# Patient Record
Sex: FEMALE | Race: WHITE | Employment: UNEMPLOYED | URBAN - METROPOLITAN AREA
[De-identification: names, ages, dates, MRNs, and addresses within clinical notes are randomized per-mention and may not be internally consistent; named-entity substitution may affect disease eponyms.]

---

## 2017-01-16 NOTE — PROGRESS NOTES
Regan James is a 40year old female. HPI:   Patient presents for a medication follow up. Restarted Lexapro in 8/2016. Doing well, less anxious. No side effects. Weight up a bit, but she had lost weight when she was having more anxiety.   Wo

## 2017-04-13 ENCOUNTER — LAB ENCOUNTER (OUTPATIENT)
Dept: LAB | Age: 37
End: 2017-04-13
Attending: PHYSICIAN ASSISTANT
Payer: COMMERCIAL

## 2017-04-13 ENCOUNTER — OFFICE VISIT (OUTPATIENT)
Dept: FAMILY MEDICINE CLINIC | Facility: CLINIC | Age: 37
End: 2017-04-13

## 2017-04-13 VITALS
HEIGHT: 63 IN | BODY MASS INDEX: 21.47 KG/M2 | HEART RATE: 84 BPM | DIASTOLIC BLOOD PRESSURE: 60 MMHG | RESPIRATION RATE: 16 BRPM | TEMPERATURE: 98 F | SYSTOLIC BLOOD PRESSURE: 100 MMHG | WEIGHT: 121.19 LBS

## 2017-04-13 DIAGNOSIS — R53.83 FATIGUE, UNSPECIFIED TYPE: ICD-10-CM

## 2017-04-13 DIAGNOSIS — R10.84 ABDOMINAL PAIN, GENERALIZED: Primary | ICD-10-CM

## 2017-04-13 DIAGNOSIS — A09 INFECTIOUS COLITIS, ENTERITIS, AND GASTROENTERITIS: ICD-10-CM

## 2017-04-13 DIAGNOSIS — R10.84 GENERALIZED ABDOMINAL PAIN: Primary | ICD-10-CM

## 2017-04-13 DIAGNOSIS — R19.7 DIARRHEA OF PRESUMED INFECTIOUS ORIGIN: ICD-10-CM

## 2017-04-13 PROCEDURE — 83690 ASSAY OF LIPASE: CPT | Performed by: PHYSICIAN ASSISTANT

## 2017-04-13 PROCEDURE — 36415 COLL VENOUS BLD VENIPUNCTURE: CPT | Performed by: PHYSICIAN ASSISTANT

## 2017-04-13 PROCEDURE — 85025 COMPLETE CBC W/AUTO DIFF WBC: CPT | Performed by: PHYSICIAN ASSISTANT

## 2017-04-13 PROCEDURE — 80053 COMPREHEN METABOLIC PANEL: CPT | Performed by: PHYSICIAN ASSISTANT

## 2017-04-13 PROCEDURE — 99214 OFFICE O/P EST MOD 30 MIN: CPT | Performed by: PHYSICIAN ASSISTANT

## 2017-04-13 PROCEDURE — 82150 ASSAY OF AMYLASE: CPT | Performed by: PHYSICIAN ASSISTANT

## 2017-04-13 NOTE — PROGRESS NOTES
CC:  Patient presents with:  Diarrhea: Abdominal cramping,diarrhea and flatulence along with fatigue and genralized aches beginning 3 days ago. Less tired today but diarrhea,cramping and gas continues twice a day.       HPI: Christ Espinoza presents with complai mesalamine (CANASA) 1000 MG Rectal Suppos Place 1,000 mg rectally 3 (three) times a week. Pt takes 3 times a week at night. Disp:  Rfl:    Benzoyl Peroxide-Erythromycin 5-3 % Apply Externally Gel Apply to face once daily.   Disp:  Rfl:    acyclovir 400 M abdominal pain  (primary encounter diagnosis)  Diarrhea of presumed infectious origin  Fatigue, unspecified type  I will review labs. I advised clear liquids advancing into a BRAT diet. She can advance her diet from there as tolerated.  She may try loperam

## 2017-04-13 NOTE — PATIENT INSTRUCTIONS
Unknown Causes of Abdominal Pain (Female)    The exact cause of your abdominal (stomach) pain is not clear. This does not mean that this is something to worry about.  Everyone likes to know the exact cause of the problem, but sometimes with abdominal pain · Water is important so you do not get dehydrated. Soup may also be good. Sports drinks may also help, especially if they are not too acidic. Make sure you don't drink sugary drinks as this can make things worse. Take liquids in small amounts.  Do not guzzl © 7626-5517 The [address] [place] [address], [address] [place]. All rights reserved. This information is not intended as a substitute for professional medical care. Always follow your healthcare professional's instructions.         Diet fo © 6459-8003 [address], [address] [place]. All rights reserved. This information is not intended as a substitute for professional medical care. Always follow your healthcare professional's instructions.

## 2017-04-14 DIAGNOSIS — R19.7 DIARRHEA, UNSPECIFIED TYPE: ICD-10-CM

## 2017-04-14 DIAGNOSIS — E87.6 LOW SERUM POTASSIUM: Primary | ICD-10-CM

## 2017-04-27 ENCOUNTER — LAB ENCOUNTER (OUTPATIENT)
Dept: LAB | Age: 37
End: 2017-04-27
Attending: PHYSICIAN ASSISTANT
Payer: COMMERCIAL

## 2017-04-27 ENCOUNTER — CHARTING TRANS (OUTPATIENT)
Dept: OTHER | Age: 37
End: 2017-04-27

## 2017-04-27 DIAGNOSIS — E87.6 LOW SERUM POTASSIUM: ICD-10-CM

## 2017-04-27 DIAGNOSIS — R19.7 DIARRHEA, UNSPECIFIED TYPE: ICD-10-CM

## 2017-04-27 DIAGNOSIS — E83.51 HYPOCALCEMIA: ICD-10-CM

## 2017-04-27 DIAGNOSIS — E83.51 HYPOCALCEMIA: Primary | ICD-10-CM

## 2017-04-27 DIAGNOSIS — E83.51 LOW CALCIUM LEVELS: Primary | ICD-10-CM

## 2017-04-27 PROCEDURE — 80053 COMPREHEN METABOLIC PANEL: CPT | Performed by: PHYSICIAN ASSISTANT

## 2017-04-27 PROCEDURE — 83735 ASSAY OF MAGNESIUM: CPT | Performed by: PHYSICIAN ASSISTANT

## 2017-04-27 PROCEDURE — 82306 VITAMIN D 25 HYDROXY: CPT | Performed by: PHYSICIAN ASSISTANT

## 2017-04-27 PROCEDURE — 36415 COLL VENOUS BLD VENIPUNCTURE: CPT | Performed by: PHYSICIAN ASSISTANT

## 2017-04-28 DIAGNOSIS — E55.9 VITAMIN D DEFICIENCY: Primary | ICD-10-CM

## 2017-05-01 ENCOUNTER — CHARTING TRANS (OUTPATIENT)
Dept: OTHER | Age: 37
End: 2017-05-01

## 2017-05-08 ENCOUNTER — CHARTING TRANS (OUTPATIENT)
Dept: OTHER | Age: 37
End: 2017-05-08

## 2017-05-18 ENCOUNTER — CHARTING TRANS (OUTPATIENT)
Dept: OTHER | Age: 37
End: 2017-05-18

## 2017-05-22 ENCOUNTER — CHARTING TRANS (OUTPATIENT)
Dept: GASTROENTEROLOGY | Age: 37
End: 2017-05-22

## 2017-06-16 ENCOUNTER — CHARTING TRANS (OUTPATIENT)
Dept: OTHER | Age: 37
End: 2017-06-16

## 2017-07-28 ENCOUNTER — APPOINTMENT (OUTPATIENT)
Dept: LAB | Age: 37
End: 2017-07-28
Attending: FAMILY MEDICINE
Payer: COMMERCIAL

## 2017-07-28 DIAGNOSIS — E55.9 VITAMIN D DEFICIENCY: ICD-10-CM

## 2017-07-28 PROCEDURE — 82310 ASSAY OF CALCIUM: CPT | Performed by: PHYSICIAN ASSISTANT

## 2017-07-28 PROCEDURE — 82306 VITAMIN D 25 HYDROXY: CPT | Performed by: PHYSICIAN ASSISTANT

## 2017-07-28 PROCEDURE — 36415 COLL VENOUS BLD VENIPUNCTURE: CPT | Performed by: PHYSICIAN ASSISTANT

## 2017-07-29 LAB
25-HYDROXYVITAMIN D (TOTAL): 34.7 NG/ML (ref 30–100)
CALCIUM BLD-MCNC: 8.5 MG/DL (ref 8.3–10.3)

## 2017-09-06 ENCOUNTER — CHARTING TRANS (OUTPATIENT)
Dept: OTHER | Age: 37
End: 2017-09-06

## 2017-12-22 ENCOUNTER — CHARTING TRANS (OUTPATIENT)
Dept: OTHER | Age: 37
End: 2017-12-22

## 2018-01-05 ENCOUNTER — CHARTING TRANS (OUTPATIENT)
Dept: OTHER | Age: 38
End: 2018-01-05

## 2018-01-31 NOTE — TELEPHONE ENCOUNTER
Fax received from [facility] requesting refill of Escitalopram.    LOV 4/13/17 was acute. The last appt for anxiety was 1/16/17. No future appointments.      Refill request for:      Pending Prescriptions Disp Refills    escitalopram 10 MG Oral Tab 90 tablet

## 2018-02-16 NOTE — PROGRESS NOTES
Yolande Flores is a 45year old female. HPI:   Samy Pierre presents for anxiety. She states her medication is controlling her anxiety well. She takes her Xanax very rarely. No insomnia or panic attacks. She has no other complaints.     Wt Readings heart sounds; excellent peripheral pulses  Abdomen: No distention  Extremities: No pedal edema  Neuro: A&O x 3; normal gait and balance  Skin: No visible rashes  Psych: Normal mood, affect, memory, judgement, and thought process    ASSESSMENT AND PLAN:   A

## 2018-02-20 ENCOUNTER — CHARTING TRANS (OUTPATIENT)
Dept: OTHER | Age: 38
End: 2018-02-20

## 2018-04-12 ENCOUNTER — CHARTING TRANS (OUTPATIENT)
Dept: OTHER | Age: 38
End: 2018-04-12

## 2018-08-02 NOTE — TELEPHONE ENCOUNTER
Fax received from [facility] requesting a refill of Escitalopram.    LOV 2/16/2018 was with Sherice COLE. He no longer works in our office. At that time, pt was advised to follow up in 6 months. Please call pt and assist her in scheduling appt.  Ask her if she

## 2018-08-03 RX ORDER — ESCITALOPRAM OXALATE 10 MG/1
10 TABLET ORAL DAILY
Qty: 90 TABLET | Refills: 1 | Status: SHIPPED | OUTPATIENT
Start: 2018-08-03 | End: 2018-08-21

## 2018-08-03 NOTE — TELEPHONE ENCOUNTER
Pt made appt for medication refill and needs a refill sent to her new local [facility].   Generic Lexapro

## 2018-08-14 ENCOUNTER — CHARTING TRANS (OUTPATIENT)
Dept: OTHER | Age: 38
End: 2018-08-14

## 2018-08-21 NOTE — PROGRESS NOTES
Palmira Oreilly is a 45year old female. HPI:   Patient presents for a medication follow up. Restarted Lexapro in 8/2016. Feeling well on it. No side effects. Using xanax one time per month. Moved to [employer].      Patient Active Problem Lis

## 2018-09-05 ENCOUNTER — CHARTING TRANS (OUTPATIENT)
Dept: OTHER | Age: 38
End: 2018-09-05

## 2018-10-08 ENCOUNTER — CHARTING TRANS (OUTPATIENT)
Dept: OTHER | Age: 38
End: 2018-10-08

## 2018-10-09 ENCOUNTER — LAB SERVICES (OUTPATIENT)
Dept: OTHER | Age: 38
End: 2018-10-09

## 2018-10-09 ENCOUNTER — CHARTING TRANS (OUTPATIENT)
Dept: OTHER | Age: 38
End: 2018-10-09

## 2018-10-09 LAB
ALBUMIN SERPL BCG-MCNC: 4.3 G/DL (ref 3.6–5.1)
ALP SERPL-CCNC: 42 U/L (ref 45–130)
ALT SERPL W/O P-5'-P-CCNC: 12 U/L (ref 7–34)
AST SERPL-CCNC: 18 U/L (ref 9–37)
BILIRUB SERPL-MCNC: 0.5 MG/DL (ref 0–1)
BUN SERPL-MCNC: 16 MG/DL (ref 7–20)
CALCIUM SERPL-MCNC: 8.8 MG/DL (ref 8.6–10.6)
CHLORIDE SERPL-SCNC: 103 MMOL/L (ref 96–107)
CHOLEST SERPL-MCNC: 174 MG/DL (ref 140–200)
CREAT SERPL-MCNC: 1 MG/DL (ref 0.5–1.4)
DIFFERENTIAL TYPE: NORMAL
GFR SERPL CREATININE-BSD FRML MDRD: >60 ML/MIN/{1.73M2}
GFR SERPL CREATININE-BSD FRML MDRD: >60 ML/MIN/{1.73M2}
GLUCOSE SERPL-MCNC: 91 MG/DL (ref 70–200)
HCO3 SERPL-SCNC: 27 MMOL/L (ref 22–32)
HDLC SERPL-MCNC: 72 MG/DL
HEMATOCRIT: 36.7 % (ref 34–45)
HEMOGLOBIN: 12.2 G/DL (ref 11.2–15.7)
LDLC SERPL CALC-MCNC: 86 MG/DL (ref 0–100)
LYMPH PERCENT: 30.5 % (ref 20.5–51.1)
LYMPHOCYTE ABSOLUTE #: 1.5 10*3/UL (ref 1.2–3.4)
MEAN CORPUSCULAR HGB CONCENTRATION: 33.2 % (ref 32–36)
MEAN CORPUSCULAR HGB: 30.6 PG (ref 27–34)
MEAN CORPUSCULAR VOLUME: 92 FL (ref 79–95)
MEAN PLATELET VOLUME: 10 FL (ref 8.6–12.4)
MIXED %: 8 % (ref 4.3–12.9)
MIXED ABSOLUTE #: 0.4 10*3/UL (ref 0.2–0.9)
NEUTROPHIL ABSOLUTE #: 3.1 10*3/UL (ref 1.4–6.5)
NEUTROPHIL PERCENT: 61.5 % (ref 34–73.5)
PLATELET COUNT: 249 10*3/UL (ref 150–400)
POTASSIUM SERPL-SCNC: 4.1 MMOL/L (ref 3.5–5.3)
PROT SERPL-MCNC: 6.7 G/DL (ref 6.2–8.1)
RED BLOOD CELL COUNT: 3.99 10*6/UL (ref 3.7–5.2)
RED CELL DISTRIBUTION WIDTH: 12.7 % (ref 11.3–14.8)
SEDIMENTATION RATE, RBC: 7 MM/H (ref 0–20)
SODIUM SERPL-SCNC: 140 MMOL/L (ref 136–146)
TRIGL SERPL-MCNC: 78 MG/DL (ref 0–200)
WHITE BLOOD CELL COUNT: 5 10*3/UL (ref 4–10)

## 2018-11-02 VITALS
SYSTOLIC BLOOD PRESSURE: 90 MMHG | DIASTOLIC BLOOD PRESSURE: 68 MMHG | TEMPERATURE: 98.6 F | BODY MASS INDEX: 22.55 KG/M2 | HEIGHT: 64 IN | WEIGHT: 132.06 LBS

## 2018-11-28 VITALS — WEIGHT: 123 LBS | HEIGHT: 64 IN | BODY MASS INDEX: 21 KG/M2 | HEART RATE: 64 BPM | RESPIRATION RATE: 14 BRPM

## 2018-12-13 DIAGNOSIS — B00.9 HERPES SIMPLEX: Primary | ICD-10-CM

## 2018-12-14 RX ORDER — ACYCLOVIR 400 MG/1
TABLET ORAL
Qty: 15 TABLET | Refills: 0 | Status: SHIPPED | OUTPATIENT
Start: 2018-12-14 | End: 2019-02-01 | Stop reason: SDUPTHER

## 2019-02-01 DIAGNOSIS — B00.9 HERPES SIMPLEX: ICD-10-CM

## 2019-02-02 RX ORDER — ACYCLOVIR 400 MG/1
TABLET ORAL
Qty: 15 TABLET | Refills: 1 | Status: SHIPPED | OUTPATIENT
Start: 2019-02-02 | End: 2019-12-19 | Stop reason: SDUPTHER

## 2019-03-05 VITALS — BODY MASS INDEX: 20.83 KG/M2 | HEIGHT: 64 IN | WEIGHT: 122 LBS

## 2019-11-09 NOTE — TELEPHONE ENCOUNTER
Patient scheduled her physical on 12/19 with Dr. Peggy Lutz. Will need a temporary medication refill till her appointment.

## 2019-11-11 RX ORDER — ESCITALOPRAM OXALATE 10 MG/1
TABLET ORAL
Qty: 30 TABLET | Refills: 0 | Status: SHIPPED | OUTPATIENT
Start: 2019-11-11 | End: 2019-12-19

## 2019-11-14 ENCOUNTER — TELEPHONE (OUTPATIENT)
Dept: GASTROENTEROLOGY | Age: 39
End: 2019-11-14

## 2019-11-18 RX ORDER — MESALAMINE 1000 MG/1
1000 SUPPOSITORY RECTAL NIGHTLY
COMMUNITY
Start: 2018-10-08 | End: 2019-11-18 | Stop reason: SDUPTHER

## 2019-11-18 RX ORDER — MESALAMINE 1000 MG/1
1000 SUPPOSITORY RECTAL NIGHTLY
Qty: 30 SUPPOSITORY | Refills: 2 | Status: SHIPPED | OUTPATIENT
Start: 2019-11-18 | End: 2019-12-09 | Stop reason: SDUPTHER

## 2019-11-23 RX ORDER — MESALAMINE 1000 MG/1
SUPPOSITORY RECTAL
Qty: 30 SUPPOSITORY | Refills: 6 | Status: SHIPPED | OUTPATIENT
Start: 2019-11-23 | End: 2019-12-09 | Stop reason: SDUPTHER

## 2019-12-09 ENCOUNTER — LAB SERVICES (OUTPATIENT)
Dept: LAB | Age: 39
End: 2019-12-09

## 2019-12-09 ENCOUNTER — OFFICE VISIT (OUTPATIENT)
Dept: GASTROENTEROLOGY | Age: 39
End: 2019-12-09

## 2019-12-09 VITALS
SYSTOLIC BLOOD PRESSURE: 86 MMHG | DIASTOLIC BLOOD PRESSURE: 60 MMHG | HEIGHT: 64 IN | WEIGHT: 121 LBS | BODY MASS INDEX: 20.66 KG/M2

## 2019-12-09 DIAGNOSIS — K51.20 ULCERATIVE PROCTITIS WITHOUT COMPLICATION (CMD): Primary | ICD-10-CM

## 2019-12-09 DIAGNOSIS — K51.20 ULCERATIVE PROCTITIS WITHOUT COMPLICATION (CMD): ICD-10-CM

## 2019-12-09 LAB
ALBUMIN SERPL-MCNC: 4.2 G/DL (ref 3.6–5.1)
ALP SERPL-CCNC: 55 U/L (ref 45–130)
ALT SERPL W/O P-5'-P-CCNC: 11 U/L (ref 4–38)
AST SERPL-CCNC: 22 U/L (ref 14–43)
BASOPHIL %: 0.4 % (ref 0–1.2)
BASOPHIL ABSOLUTE #: 0 10*3/UL (ref 0–0.1)
BILIRUB SERPL-MCNC: 0.2 MG/DL (ref 0–1.3)
BUN SERPL-MCNC: 23 MG/DL (ref 7–20)
CALCIUM SERPL-MCNC: 9.5 MG/DL (ref 8.6–10.6)
CHLORIDE SERPL-SCNC: 103 MMOL/L (ref 96–107)
CO2 SERPL-SCNC: 27 MMOL/L (ref 22–32)
CREAT SERPL-MCNC: 1 MG/DL (ref 0.5–1.4)
DIFFERENTIAL TYPE: ABNORMAL
EOSINOPHIL %: 1 % (ref 0–10)
EOSINOPHIL ABSOLUTE #: 0.1 10*3/UL (ref 0–0.5)
GFR SERPL CREATININE-BSD FRML MDRD: >60 ML/MIN/{1.73M2}
GFR SERPL CREATININE-BSD FRML MDRD: >60 ML/MIN/{1.73M2}
GLUCOSE SERPL-MCNC: 96 MG/DL (ref 70–200)
HEMATOCRIT: 36.9 % (ref 34–45)
HEMOGLOBIN: 11.8 G/DL (ref 11.2–15.7)
LYMPH PERCENT: 16 % (ref 20.5–51.1)
LYMPHOCYTE ABSOLUTE #: 1.3 10*3/UL (ref 1.2–3.4)
MEAN CORPUSCULAR HGB CONCENTRATION: 32 % (ref 32–36)
MEAN CORPUSCULAR HGB: 29 PG (ref 27–34)
MEAN CORPUSCULAR VOLUME: 90.7 FL (ref 79–95)
MEAN PLATELET VOLUME: 11.1 FL (ref 8.6–12.4)
MONOCYTE ABSOLUTE #: 0.7 10*3/UL (ref 0.2–0.9)
MONOCYTE PERCENT: 8.3 % (ref 4.3–12.9)
NEUTROPHIL ABSOLUTE #: 5.9 10*3/UL (ref 1.4–6.5)
NEUTROPHIL PERCENT: 74.3 % (ref 34–73.5)
PLATELET COUNT: 267 10*3/UL (ref 150–400)
POTASSIUM SERPL-SCNC: 4.3 MMOL/L (ref 3.5–5.3)
PROT SERPL-MCNC: 7 G/DL (ref 6.4–8.5)
RED BLOOD CELL COUNT: 4.07 10*6/UL (ref 3.7–5.2)
RED CELL DISTRIBUTION WIDTH: 13 % (ref 11.3–14.8)
SODIUM SERPL-SCNC: 138 MMOL/L (ref 136–146)
WHITE BLOOD CELL COUNT: 8 10*3/UL (ref 4–10)

## 2019-12-09 PROCEDURE — 99214 OFFICE O/P EST MOD 30 MIN: CPT | Performed by: INTERNAL MEDICINE

## 2019-12-09 PROCEDURE — 85025 COMPLETE CBC W/AUTO DIFF WBC: CPT | Performed by: INTERNAL MEDICINE

## 2019-12-09 PROCEDURE — 36415 COLL VENOUS BLD VENIPUNCTURE: CPT | Performed by: INTERNAL MEDICINE

## 2019-12-09 PROCEDURE — 80053 COMPREHEN METABOLIC PANEL: CPT | Performed by: INTERNAL MEDICINE

## 2019-12-09 RX ORDER — POLYETHYLENE GLYCOL 3350 17 G/17G
POWDER, FOR SOLUTION ORAL
Qty: 255 G | Refills: 0 | Status: SHIPPED | OUTPATIENT
Start: 2019-12-09 | End: 2020-01-23

## 2019-12-09 RX ORDER — BISACODYL 5 MG/1
TABLET, DELAYED RELEASE ORAL
Qty: 4 TABLET | Refills: 0 | Status: SHIPPED | OUTPATIENT
Start: 2019-12-09 | End: 2020-01-23

## 2019-12-09 RX ORDER — VITAMIN B COMPLEX
2 TABLET ORAL DAILY
COMMUNITY

## 2019-12-09 RX ORDER — MULTIVIT WITH CALCIUM,IRON,MIN
1 TABLET ORAL DAILY
COMMUNITY

## 2019-12-09 RX ORDER — MESALAMINE 1000 MG/1
1000 SUPPOSITORY RECTAL NIGHTLY
Qty: 30 SUPPOSITORY | Refills: 11 | Status: SHIPPED | OUTPATIENT
Start: 2019-12-09 | End: 2021-05-17

## 2019-12-09 RX ORDER — ESCITALOPRAM OXALATE 10 MG/1
TABLET ORAL DAILY
COMMUNITY
Start: 2019-11-11 | End: 2023-03-14 | Stop reason: SDUPTHER

## 2019-12-09 RX ORDER — SIMETHICONE 125 MG
TABLET,CHEWABLE ORAL
Qty: 1 TABLET | Refills: 0 | Status: SHIPPED | OUTPATIENT
Start: 2019-12-09 | End: 2020-01-23

## 2019-12-09 RX ORDER — ALPRAZOLAM 0.25 MG/1
0.25 TABLET ORAL PRN
COMMUNITY
Start: 2018-02-16

## 2019-12-13 ENCOUNTER — APPOINTMENT (OUTPATIENT)
Dept: GASTROENTEROLOGY | Age: 39
End: 2019-12-13
Attending: INTERNAL MEDICINE

## 2019-12-13 DIAGNOSIS — K52.9 NONINFECTIVE GASTROENTERITIS AND COLITIS, UNSPECIFIED: ICD-10-CM

## 2019-12-13 LAB — PREGNANCY TEST, URINE: NEGATIVE

## 2019-12-13 PROCEDURE — 45380 COLONOSCOPY AND BIOPSY: CPT | Performed by: INTERNAL MEDICINE

## 2019-12-13 PROCEDURE — 88305 TISSUE EXAM BY PATHOLOGIST: CPT | Performed by: PATHOLOGY

## 2019-12-18 LAB — AP REPORT: NORMAL

## 2019-12-19 ENCOUNTER — OFFICE VISIT (OUTPATIENT)
Dept: FAMILY MEDICINE CLINIC | Facility: CLINIC | Age: 39
End: 2019-12-19
Payer: COMMERCIAL

## 2019-12-19 VITALS
RESPIRATION RATE: 16 BRPM | DIASTOLIC BLOOD PRESSURE: 60 MMHG | BODY MASS INDEX: 21.89 KG/M2 | TEMPERATURE: 98 F | HEIGHT: 62.5 IN | SYSTOLIC BLOOD PRESSURE: 100 MMHG | HEART RATE: 60 BPM | WEIGHT: 122 LBS

## 2019-12-19 DIAGNOSIS — Z00.00 ROUTINE GENERAL MEDICAL EXAMINATION AT A HEALTH CARE FACILITY: Primary | ICD-10-CM

## 2019-12-19 DIAGNOSIS — B00.9 HERPES SIMPLEX: ICD-10-CM

## 2019-12-19 DIAGNOSIS — F41.9 ANXIETY: ICD-10-CM

## 2019-12-19 PROCEDURE — 99395 PREV VISIT EST AGE 18-39: CPT | Performed by: FAMILY MEDICINE

## 2019-12-19 RX ORDER — ESCITALOPRAM OXALATE 10 MG/1
10 TABLET ORAL
Qty: 90 TABLET | Refills: 3 | Status: SHIPPED | OUTPATIENT
Start: 2019-12-19 | End: 2021-01-05

## 2019-12-19 RX ORDER — ACYCLOVIR 400 MG/1
TABLET ORAL
Qty: 15 TABLET | Refills: 0 | Status: SHIPPED | OUTPATIENT
Start: 2019-12-19 | End: 2020-01-23 | Stop reason: SDUPTHER

## 2019-12-19 RX ORDER — AZITHROMYCIN 250 MG/1
TABLET, FILM COATED ORAL
Qty: 6 TABLET | Refills: 0 | Status: SHIPPED | OUTPATIENT
Start: 2019-12-19 | End: 2021-01-05

## 2019-12-19 NOTE — PROGRESS NOTES
HPI:   Astrid Sands is a 44year old female who presents for a complete physical exam.  Last pap:   With GYN  Last mammogram:  /   Menses:  regular  Contraception:  /  Previous colonoscopy:  Regular with GI  Previous DEXA:  /.  Current or previous t History:   Diagnosis Date   • Anxiety state, unspecified    • Herpes simplex without mention of complication 27/48/38   • Mental disorder    • Proctitis       Past Surgical History:   Procedure Laterality Date   • COLONOSCOPY WITH BIOPSY  9/30/13      Morgan Reviewed from GI  · Colonoscopy:  UTD  · Vaccines:  /  · Recommended low fat diet and regular exercise. · The patient is asked to return for CPX in 1 year. · meds refilled  · zpak as directed.   No orders of the defined types were placed in this encoun

## 2020-01-23 DIAGNOSIS — B00.9 HERPES SIMPLEX: ICD-10-CM

## 2020-01-23 RX ORDER — ACYCLOVIR 400 MG/1
TABLET ORAL
Qty: 15 TABLET | Refills: 0 | Status: SHIPPED | OUTPATIENT
Start: 2020-01-23 | End: 2020-10-05

## 2020-05-21 ENCOUNTER — TELEPHONE (OUTPATIENT)
Dept: DERMATOLOGY | Age: 40
End: 2020-05-21

## 2020-06-24 ENCOUNTER — DOCUMENTATION (OUTPATIENT)
Dept: DERMATOLOGY | Age: 40
End: 2020-06-24

## 2020-06-25 ENCOUNTER — OFFICE VISIT (OUTPATIENT)
Dept: DERMATOLOGY | Age: 40
End: 2020-06-25

## 2020-06-25 ENCOUNTER — TELEPHONE (OUTPATIENT)
Dept: DERMATOLOGY | Age: 40
End: 2020-06-25

## 2020-06-25 DIAGNOSIS — L81.2 FRECKLES: Primary | ICD-10-CM

## 2020-06-25 PROCEDURE — 99213 OFFICE O/P EST LOW 20 MIN: CPT | Performed by: DERMATOLOGY

## 2020-06-30 ENCOUNTER — OFFICE VISIT (OUTPATIENT)
Dept: DERMATOLOGY | Age: 40
End: 2020-06-30

## 2020-06-30 DIAGNOSIS — Z41.9 ELECTIVE SURGERY FOR PURPOSES OTHER THAN REMEDYING HEALTH STATES: Primary | ICD-10-CM

## 2020-06-30 PROCEDURE — X0703 COSMETIC BOTOX PER UNIT: HCPCS | Performed by: NURSE PRACTITIONER

## 2020-08-21 ENCOUNTER — HOSPITAL ENCOUNTER (OUTPATIENT)
Dept: MAMMOGRAPHY | Age: 40
Discharge: HOME | End: 2020-08-21
Attending: OBSTETRICS & GYNECOLOGY
Payer: COMMERCIAL

## 2020-08-21 DIAGNOSIS — Z12.31 VISIT FOR SCREENING MAMMOGRAM: ICD-10-CM

## 2020-08-21 PROCEDURE — 77067 SCR MAMMO BI INCL CAD: CPT | Performed by: OBSTETRICS & GYNECOLOGY

## 2020-08-21 PROCEDURE — 77063 BREAST TOMOSYNTHESIS BI: CPT | Performed by: OBSTETRICS & GYNECOLOGY

## 2020-08-24 ENCOUNTER — HOSPITAL ENCOUNTER (OUTPATIENT)
Dept: MAMMOGRAPHY | Facility: HOSPITAL | Age: 40
Discharge: HOME | End: 2020-08-24
Attending: OBSTETRICS & GYNECOLOGY
Payer: COMMERCIAL

## 2020-08-24 DIAGNOSIS — Z12.31 OTHER SCREENING MAMMOGRAM: ICD-10-CM

## 2020-08-24 PROCEDURE — 76642 ULTRASOUND BREAST LIMITED: CPT | Performed by: OBSTETRICS & GYNECOLOGY

## 2020-10-05 DIAGNOSIS — B00.9 HERPES SIMPLEX: ICD-10-CM

## 2020-10-05 RX ORDER — ACYCLOVIR 400 MG/1
TABLET ORAL
Qty: 15 TABLET | Refills: 0 | Status: SHIPPED | OUTPATIENT
Start: 2020-10-05 | End: 2021-02-17

## 2020-12-31 RX ORDER — ESCITALOPRAM OXALATE 10 MG/1
TABLET ORAL
Qty: 90 TABLET | Refills: 0 | OUTPATIENT
Start: 2020-12-31

## 2020-12-31 NOTE — TELEPHONE ENCOUNTER
Refill request for:    Requested Prescriptions     Pending Prescriptions Disp Refills   • ESCITALOPRAM 10 MG Oral Tab [Pharmacy Med Name: Escitalopram Oxalate 10 Mg Tab Solc] 90 tablet 0     Sig: TAKE ONE TABLET BY MOUTH ONE TIME DAILY        Last Prescrib

## 2021-01-05 NOTE — PROGRESS NOTES
Virtual Telephone Check-In    Bryce Sevilla verbally consents to a Virtual/Telephone Check-In visit on 01/05/21. Patient has been referred to the [facility] website at [email or web address] to review the yearly Consent to Treat document.     Patient un

## 2021-01-12 ENCOUNTER — OFFICE VISIT (OUTPATIENT)
Dept: DERMATOLOGY | Age: 41
End: 2021-01-12

## 2021-01-12 DIAGNOSIS — Z41.9 ELECTIVE SURGERY FOR PURPOSES OTHER THAN REMEDYING HEALTH STATES: Primary | ICD-10-CM

## 2021-01-12 PROCEDURE — X0703 COSMETIC BOTOX PER UNIT: HCPCS | Performed by: NURSE PRACTITIONER

## 2021-02-16 DIAGNOSIS — B00.9 HERPES SIMPLEX: ICD-10-CM

## 2021-02-17 RX ORDER — ACYCLOVIR 400 MG/1
TABLET ORAL
Qty: 15 TABLET | Refills: 0 | Status: SHIPPED | OUTPATIENT
Start: 2021-02-17 | End: 2021-04-05 | Stop reason: SDUPTHER

## 2021-03-04 ENCOUNTER — HOSPITAL ENCOUNTER (OUTPATIENT)
Dept: ULTRASOUND IMAGING | Age: 41
Discharge: HOME | End: 2021-03-04
Attending: OBSTETRICS & GYNECOLOGY
Payer: COMMERCIAL

## 2021-03-04 DIAGNOSIS — N63.0 LUMP OR MASS IN BREAST: ICD-10-CM

## 2021-03-04 PROCEDURE — 76642 ULTRASOUND BREAST LIMITED: CPT | Performed by: OBSTETRICS & GYNECOLOGY

## 2021-04-05 DIAGNOSIS — B00.9 HERPES SIMPLEX: ICD-10-CM

## 2021-04-05 RX ORDER — ACYCLOVIR 400 MG/1
400 TABLET ORAL 3 TIMES DAILY
Qty: 15 TABLET | Refills: 0 | Status: SHIPPED | OUTPATIENT
Start: 2021-04-05 | End: 2021-04-06 | Stop reason: SDUPTHER

## 2021-04-06 DIAGNOSIS — B00.9 HERPES SIMPLEX: ICD-10-CM

## 2021-04-06 RX ORDER — ACYCLOVIR 400 MG/1
400 TABLET ORAL 3 TIMES DAILY
Qty: 15 TABLET | Refills: 0 | Status: SHIPPED | OUTPATIENT
Start: 2021-04-06 | End: 2021-08-17

## 2021-04-06 RX ORDER — ACYCLOVIR 400 MG/1
TABLET ORAL
Qty: 15 TABLET | Refills: 0 | OUTPATIENT
Start: 2021-04-06

## 2021-05-13 ENCOUNTER — APPOINTMENT (OUTPATIENT)
Dept: DERMATOLOGY | Age: 41
End: 2021-05-13

## 2021-05-17 RX ORDER — MESALAMINE 1000 MG/1
SUPPOSITORY RECTAL
Qty: 30 SUPPOSITORY | Refills: 5 | Status: SHIPPED | OUTPATIENT
Start: 2021-05-17 | End: 2022-02-16 | Stop reason: SDUPTHER

## 2021-05-27 ENCOUNTER — OFFICE VISIT (OUTPATIENT)
Dept: DERMATOLOGY | Age: 41
End: 2021-05-27

## 2021-05-27 DIAGNOSIS — Z41.9 ELECTIVE SURGERY FOR PURPOSES OTHER THAN REMEDYING HEALTH STATES: Primary | ICD-10-CM

## 2021-05-27 PROCEDURE — X0703 COSMETIC BOTOX PER UNIT: HCPCS | Performed by: NURSE PRACTITIONER

## 2021-08-17 DIAGNOSIS — B00.9 HERPES SIMPLEX: ICD-10-CM

## 2021-08-17 RX ORDER — ACYCLOVIR 400 MG/1
TABLET ORAL
Qty: 15 TABLET | Refills: 0 | Status: SHIPPED | OUTPATIENT
Start: 2021-08-17 | End: 2022-01-10

## 2021-09-03 PROCEDURE — 88175 CYTOPATH C/V AUTO FLUID REDO: CPT | Performed by: OBSTETRICS & GYNECOLOGY

## 2021-09-03 PROCEDURE — 87624 HPV HI-RISK TYP POOLED RSLT: CPT | Performed by: OBSTETRICS & GYNECOLOGY

## 2021-09-24 ENCOUNTER — OFFICE VISIT (OUTPATIENT)
Dept: FAMILY MEDICINE CLINIC | Facility: CLINIC | Age: 41
End: 2021-09-24
Payer: COMMERCIAL

## 2021-09-24 VITALS
SYSTOLIC BLOOD PRESSURE: 90 MMHG | RESPIRATION RATE: 16 BRPM | HEART RATE: 70 BPM | TEMPERATURE: 98 F | WEIGHT: 122.38 LBS | HEIGHT: 63.39 IN | BODY MASS INDEX: 21.42 KG/M2 | DIASTOLIC BLOOD PRESSURE: 60 MMHG

## 2021-09-24 DIAGNOSIS — F41.9 ANXIETY: ICD-10-CM

## 2021-09-24 DIAGNOSIS — Z00.00 WELL WOMAN EXAM (NO GYNECOLOGICAL EXAM): Primary | ICD-10-CM

## 2021-09-24 DIAGNOSIS — Z11.1 SCREENING-PULMONARY TB: ICD-10-CM

## 2021-09-24 PROCEDURE — 99396 PREV VISIT EST AGE 40-64: CPT | Performed by: NURSE PRACTITIONER

## 2021-09-24 PROCEDURE — 3074F SYST BP LT 130 MM HG: CPT | Performed by: NURSE PRACTITIONER

## 2021-09-24 PROCEDURE — 3078F DIAST BP <80 MM HG: CPT | Performed by: NURSE PRACTITIONER

## 2021-09-24 PROCEDURE — 3008F BODY MASS INDEX DOCD: CPT | Performed by: NURSE PRACTITIONER

## 2021-09-24 PROCEDURE — 86580 TB INTRADERMAL TEST: CPT | Performed by: NURSE PRACTITIONER

## 2021-09-24 NOTE — PROGRESS NOTES
Karena Hurd is a 39year old female who presents for a complete physical exam:       Patient has no acute complaints. Has prior history of anxiety, manages with escitalopram. Feels this is working well, mood is stable.  Denies racing thoughts, Diagnosis Date   • Anxiety state, unspecified    • Herpes simplex without mention of complication 83/85/89   • Mental disorder    • Proctitis       Past Surgical History:   Procedure Laterality Date   • COLONOSCOPY WITH BIOPSY  9/30/13      Family Histor or itching.   MUSCULOSKELETAL: denies back pain  NEURO: denies headaches, dizziness, syncope  PSYCH: denies difficulty concentrating, mood changes or suicidal/homicidal ideations      EXAM:   BP 90/60   Pulse 70   Temp 98.3 °F (36.8 °C) (Temporal)   Resp 16 PPD/Tubersol/Aplisol (36384) (DX V74.1/Z11. 1)      Meds & Refills for this Visit:  Requested Prescriptions      No prescriptions requested or ordered in this encounter       Imaging & Consults:  None    No follow-ups on file.   There are no Patient Instruct

## 2021-09-27 ENCOUNTER — NURSE ONLY (OUTPATIENT)
Dept: FAMILY MEDICINE CLINIC | Facility: CLINIC | Age: 41
End: 2021-09-27
Payer: COMMERCIAL

## 2021-09-27 VITALS — TEMPERATURE: 98 F

## 2021-09-27 DIAGNOSIS — Z23 NEEDS FLU SHOT: Primary | ICD-10-CM

## 2021-09-27 DIAGNOSIS — Z11.1 SCREENING FOR TUBERCULOSIS: ICD-10-CM

## 2021-09-27 PROCEDURE — 90686 IIV4 VACC NO PRSV 0.5 ML IM: CPT | Performed by: FAMILY MEDICINE

## 2021-09-27 PROCEDURE — 90471 IMMUNIZATION ADMIN: CPT | Performed by: FAMILY MEDICINE

## 2021-09-27 NOTE — PROGRESS NOTES
Patient is here for TB read and flu shot. She has no complaints. The TB is recorded as 0 mm negative. The flu shot is given in the left deltoid.  Patient has signed consent and given VIS

## 2021-10-13 ENCOUNTER — HOSPITAL ENCOUNTER (OUTPATIENT)
Dept: MAMMOGRAPHY | Age: 41
Discharge: HOME | End: 2021-10-13
Attending: OBSTETRICS & GYNECOLOGY
Payer: COMMERCIAL

## 2021-10-13 DIAGNOSIS — D24.1 FIBROADENOMA OF BREAST, RIGHT: ICD-10-CM

## 2021-10-13 PROCEDURE — 77067 SCR MAMMO BI INCL CAD: CPT | Performed by: OBSTETRICS & GYNECOLOGY

## 2021-10-13 PROCEDURE — 77063 BREAST TOMOSYNTHESIS BI: CPT | Performed by: OBSTETRICS & GYNECOLOGY

## 2021-11-03 ENCOUNTER — HOSPITAL ENCOUNTER (OUTPATIENT)
Dept: ULTRASOUND IMAGING | Age: 41
Discharge: HOME | End: 2021-11-03
Attending: OBSTETRICS & GYNECOLOGY
Payer: COMMERCIAL

## 2021-11-03 DIAGNOSIS — R92.2 INCONCLUSIVE MAMMOGRAM: ICD-10-CM

## 2021-11-03 PROCEDURE — 76642 ULTRASOUND BREAST LIMITED: CPT | Performed by: OBSTETRICS & GYNECOLOGY

## 2021-11-03 NOTE — IMAGING NOTE
Assisted Dr Watson Buenrostro with right breast biopsy x 2 sites recommendation, BIRADS 4a. Explained procedure and written instructions given. Pt screened and denies blood thinners, bleeding issues, chemo or liver disease.  Reviewed to eat, take 1000 mg of acetaminop

## 2021-11-11 ENCOUNTER — APPOINTMENT (OUTPATIENT)
Dept: GASTROENTEROLOGY | Age: 41
End: 2021-11-11

## 2021-11-12 ENCOUNTER — HOSPITAL ENCOUNTER (OUTPATIENT)
Dept: ULTRASOUND IMAGING | Age: 41
Discharge: HOME | End: 2021-11-12
Attending: NURSE PRACTITIONER
Payer: COMMERCIAL

## 2021-11-12 ENCOUNTER — HOSPITAL ENCOUNTER (OUTPATIENT)
Dept: MAMMOGRAPHY | Age: 41
Discharge: HOME | End: 2021-11-12
Attending: NURSE PRACTITIONER
Payer: COMMERCIAL

## 2021-11-12 DIAGNOSIS — N63.0 LUMP OR MASS IN BREAST: ICD-10-CM

## 2021-11-12 PROCEDURE — 19083 BX BREAST 1ST LESION US IMAG: CPT | Performed by: NURSE PRACTITIONER

## 2021-11-12 PROCEDURE — 77065 DX MAMMO INCL CAD UNI: CPT | Performed by: NURSE PRACTITIONER

## 2021-11-12 PROCEDURE — 88342 IMHCHEM/IMCYTCHM 1ST ANTB: CPT | Performed by: NURSE PRACTITIONER

## 2021-11-12 PROCEDURE — 19084 BX BREAST ADD LESION US IMAG: CPT | Performed by: NURSE PRACTITIONER

## 2021-11-12 PROCEDURE — 88305 TISSUE EXAM BY PATHOLOGIST: CPT | Performed by: NURSE PRACTITIONER

## 2021-11-15 ENCOUNTER — TELEPHONE (OUTPATIENT)
Dept: MAMMOGRAPHY | Facility: HOSPITAL | Age: 41
End: 2021-11-15

## 2021-11-15 NOTE — TELEPHONE ENCOUNTER
Telephoned Cheryl Zengortino and name,  verified with patient. Notified Lucita Eller of right breast 2 site positive for [facility] biopsy result. [facility] pending. Lucita Eller reports biopsy site is healing well.  Radiologist recommends

## 2021-12-08 NOTE — H&P (VIEW-ONLY)
12/8/2021    Patient presents with:  Consult: Ref: Dr. Varun Condon right breast ALH and fibroadenoma      HPI:    Alber Sims is a 39year old female who presents for a breast evaluation   Recent ultrasound reveals 2 solid masses right breast, one la pertinent positives and negatives per HPI    EXAM:    GENERAL: well developed, well nourished female, in no apparent distress  SKIN: anicteric  HEENT: normocephalic; sclera anicteric  NECK: supple, no JVD  RESPIRATORY: clear to auscultation  CARDIOVASCULAR device. A clip was then deployed, and post procedure mammogram demonstrated the clip to be in the appropriate position. Post procedural mammogram performed on separate digital equipment show the clip in appropriate position.   The procedure was tolerated 10:01 AM.  INDICATIONS:  N63.0 Lump or mass in breast  DESCRIPTION:  The patient's previous mammogram/ultrasound were reviewed.   The procedure, its alternatives, and potential risks and complications were discussed with the patient prior to the biopsy proc PM               IMPRESSION:    Two solid nodules right breast  Both fibroadenoma with ALH          PLAN:    Wire localization and excision  Details of surgery reviewed  Post op scaring discussed

## 2021-12-09 ENCOUNTER — TELEPHONE (OUTPATIENT)
Dept: GENERAL RADIOLOGY | Facility: HOSPITAL | Age: 41
End: 2021-12-09

## 2021-12-24 ENCOUNTER — LAB ENCOUNTER (OUTPATIENT)
Dept: LAB | Age: 41
End: 2021-12-24
Attending: SURGERY
Payer: COMMERCIAL

## 2021-12-24 DIAGNOSIS — N63.10 BREAST MASS, RIGHT: ICD-10-CM

## 2021-12-27 ENCOUNTER — ANESTHESIA (OUTPATIENT)
Dept: SURGERY | Facility: HOSPITAL | Age: 41
End: 2021-12-27
Payer: COMMERCIAL

## 2021-12-27 ENCOUNTER — HOSPITAL ENCOUNTER (OUTPATIENT)
Dept: MAMMOGRAPHY | Facility: HOSPITAL | Age: 41
Discharge: HOME | End: 2021-12-27
Attending: SURGERY
Payer: COMMERCIAL

## 2021-12-27 ENCOUNTER — ANESTHESIA EVENT (OUTPATIENT)
Dept: SURGERY | Facility: HOSPITAL | Age: 41
End: 2021-12-27
Payer: COMMERCIAL

## 2021-12-27 ENCOUNTER — HOSPITAL ENCOUNTER (OUTPATIENT)
Facility: HOSPITAL | Age: 41
Setting detail: HOSPITAL OUTPATIENT SURGERY
Discharge: HOME | End: 2021-12-27
Attending: SURGERY | Admitting: SURGERY
Payer: COMMERCIAL

## 2021-12-27 VITALS
WEIGHT: 122 LBS | RESPIRATION RATE: 16 BRPM | HEIGHT: 63 IN | BODY MASS INDEX: 21.62 KG/M2 | DIASTOLIC BLOOD PRESSURE: 72 MMHG | HEART RATE: 81 BPM | SYSTOLIC BLOOD PRESSURE: 103 MMHG | TEMPERATURE: 98 F | OXYGEN SATURATION: 100 %

## 2021-12-27 DIAGNOSIS — Z20.822 ENCOUNTER FOR PREOPERATIVE SCREENING LABORATORY TESTING FOR COVID-19 VIRUS: Primary | ICD-10-CM

## 2021-12-27 DIAGNOSIS — N63.10 BREAST MASS, RIGHT: ICD-10-CM

## 2021-12-27 DIAGNOSIS — Z01.812 ENCOUNTER FOR PREOPERATIVE SCREENING LABORATORY TESTING FOR COVID-19 VIRUS: Primary | ICD-10-CM

## 2021-12-27 LAB — B-HCG UR QL: NEGATIVE

## 2021-12-27 PROCEDURE — 19281 PERQ DEVICE BREAST 1ST IMAG: CPT | Performed by: SURGERY

## 2021-12-27 PROCEDURE — 88305 TISSUE EXAM BY PATHOLOGIST: CPT | Performed by: SURGERY

## 2021-12-27 PROCEDURE — BH00ZZZ PLAIN RADIOGRAPHY OF RIGHT BREAST: ICD-10-PCS | Performed by: RADIOLOGY

## 2021-12-27 PROCEDURE — 76098 X-RAY EXAM SURGICAL SPECIMEN: CPT | Performed by: SURGERY

## 2021-12-27 PROCEDURE — 19282 PERQ DEVICE BREAST EA IMAG: CPT | Performed by: SURGERY

## 2021-12-27 PROCEDURE — 81025 URINE PREGNANCY TEST: CPT

## 2021-12-27 PROCEDURE — 0HBT0ZZ EXCISION OF RIGHT BREAST, OPEN APPROACH: ICD-10-PCS | Performed by: SURGERY

## 2021-12-27 RX ORDER — HYDROCODONE BITARTRATE AND ACETAMINOPHEN 5; 325 MG/1; MG/1
1 TABLET ORAL EVERY 4 HOURS PRN
Qty: 20 TABLET | Refills: 0 | Status: SHIPPED | OUTPATIENT
Start: 2021-12-27 | End: 2022-01-11

## 2021-12-27 RX ORDER — HYDROCODONE BITARTRATE AND ACETAMINOPHEN 5; 325 MG/1; MG/1
1 TABLET ORAL AS NEEDED
Status: DISCONTINUED | OUTPATIENT
Start: 2021-12-27 | End: 2021-12-27

## 2021-12-27 RX ORDER — SODIUM CHLORIDE, SODIUM LACTATE, POTASSIUM CHLORIDE, CALCIUM CHLORIDE 600; 310; 30; 20 MG/100ML; MG/100ML; MG/100ML; MG/100ML
INJECTION, SOLUTION INTRAVENOUS CONTINUOUS
Status: DISCONTINUED | OUTPATIENT
Start: 2021-12-27 | End: 2021-12-27

## 2021-12-27 RX ORDER — LIDOCAINE HYDROCHLORIDE AND EPINEPHRINE 10; 10 MG/ML; UG/ML
INJECTION, SOLUTION INFILTRATION; PERINEURAL AS NEEDED
Status: DISCONTINUED | OUTPATIENT
Start: 2021-12-27 | End: 2021-12-27 | Stop reason: HOSPADM

## 2021-12-27 RX ORDER — HYDROCODONE BITARTRATE AND ACETAMINOPHEN 5; 325 MG/1; MG/1
2 TABLET ORAL AS NEEDED
Status: DISCONTINUED | OUTPATIENT
Start: 2021-12-27 | End: 2021-12-27

## 2021-12-27 RX ORDER — ACETAMINOPHEN 500 MG
1000 TABLET ORAL ONCE
Status: DISCONTINUED | OUTPATIENT
Start: 2021-12-27 | End: 2021-12-27 | Stop reason: HOSPADM

## 2021-12-27 RX ORDER — LABETALOL HYDROCHLORIDE 5 MG/ML
5 INJECTION, SOLUTION INTRAVENOUS EVERY 5 MIN PRN
Status: DISCONTINUED | OUTPATIENT
Start: 2021-12-27 | End: 2021-12-27

## 2021-12-27 RX ORDER — CLINDAMYCIN PHOSPHATE 900 MG/50ML
INJECTION INTRAVENOUS
Status: DISCONTINUED
Start: 2021-12-27 | End: 2021-12-27

## 2021-12-27 RX ORDER — ONDANSETRON 2 MG/ML
4 INJECTION INTRAMUSCULAR; INTRAVENOUS AS NEEDED
Status: DISCONTINUED | OUTPATIENT
Start: 2021-12-27 | End: 2021-12-27

## 2021-12-27 RX ORDER — HYDROMORPHONE HYDROCHLORIDE 1 MG/ML
0.4 INJECTION, SOLUTION INTRAMUSCULAR; INTRAVENOUS; SUBCUTANEOUS EVERY 5 MIN PRN
Status: DISCONTINUED | OUTPATIENT
Start: 2021-12-27 | End: 2021-12-27

## 2021-12-27 RX ORDER — LIDOCAINE HYDROCHLORIDE 10 MG/ML
INJECTION, SOLUTION EPIDURAL; INFILTRATION; INTRACAUDAL; PERINEURAL AS NEEDED
Status: DISCONTINUED | OUTPATIENT
Start: 2021-12-27 | End: 2021-12-27 | Stop reason: SURG

## 2021-12-27 RX ORDER — DIAZEPAM 5 MG/1
5 TABLET ORAL AS NEEDED
Status: DISCONTINUED | OUTPATIENT
Start: 2021-12-27 | End: 2021-12-27 | Stop reason: HOSPADM

## 2021-12-27 RX ORDER — METOCLOPRAMIDE HYDROCHLORIDE 5 MG/ML
10 INJECTION INTRAMUSCULAR; INTRAVENOUS AS NEEDED
Status: DISCONTINUED | OUTPATIENT
Start: 2021-12-27 | End: 2021-12-27

## 2021-12-27 RX ORDER — CLINDAMYCIN PHOSPHATE 900 MG/50ML
900 INJECTION INTRAVENOUS ONCE
Status: COMPLETED | OUTPATIENT
Start: 2021-12-27 | End: 2021-12-27

## 2021-12-27 RX ORDER — DIPHENHYDRAMINE HYDROCHLORIDE 50 MG/ML
12.5 INJECTION INTRAMUSCULAR; INTRAVENOUS AS NEEDED
Status: DISCONTINUED | OUTPATIENT
Start: 2021-12-27 | End: 2021-12-27

## 2021-12-27 RX ORDER — MIDAZOLAM HYDROCHLORIDE 1 MG/ML
INJECTION INTRAMUSCULAR; INTRAVENOUS AS NEEDED
Status: DISCONTINUED | OUTPATIENT
Start: 2021-12-27 | End: 2021-12-27 | Stop reason: SURG

## 2021-12-27 RX ORDER — BUPIVACAINE HYDROCHLORIDE 5 MG/ML
INJECTION, SOLUTION EPIDURAL; INTRACAUDAL AS NEEDED
Status: DISCONTINUED | OUTPATIENT
Start: 2021-12-27 | End: 2021-12-27 | Stop reason: HOSPADM

## 2021-12-27 RX ORDER — MEPERIDINE HYDROCHLORIDE 25 MG/ML
INJECTION INTRAMUSCULAR; INTRAVENOUS; SUBCUTANEOUS
Status: COMPLETED
Start: 2021-12-27 | End: 2021-12-27

## 2021-12-27 RX ORDER — MEPERIDINE HYDROCHLORIDE 25 MG/ML
12.5 INJECTION INTRAMUSCULAR; INTRAVENOUS; SUBCUTANEOUS AS NEEDED
Status: DISCONTINUED | OUTPATIENT
Start: 2021-12-27 | End: 2021-12-27

## 2021-12-27 RX ORDER — ACETAMINOPHEN 500 MG
1000 TABLET ORAL ONCE AS NEEDED
Status: DISCONTINUED | OUTPATIENT
Start: 2021-12-27 | End: 2021-12-27

## 2021-12-27 RX ORDER — NALOXONE HYDROCHLORIDE 0.4 MG/ML
80 INJECTION, SOLUTION INTRAMUSCULAR; INTRAVENOUS; SUBCUTANEOUS AS NEEDED
Status: DISCONTINUED | OUTPATIENT
Start: 2021-12-27 | End: 2021-12-27

## 2021-12-27 RX ADMIN — SODIUM CHLORIDE, SODIUM LACTATE, POTASSIUM CHLORIDE, CALCIUM CHLORIDE: 600; 310; 30; 20 INJECTION, SOLUTION INTRAVENOUS at 08:56:00

## 2021-12-27 RX ADMIN — CLINDAMYCIN PHOSPHATE 900 MG: 900 INJECTION INTRAVENOUS at 09:03:00

## 2021-12-27 RX ADMIN — LIDOCAINE HYDROCHLORIDE 50 MG: 10 INJECTION, SOLUTION EPIDURAL; INFILTRATION; INTRACAUDAL; PERINEURAL at 09:01:00

## 2021-12-27 RX ADMIN — MIDAZOLAM HYDROCHLORIDE 2 MG: 1 INJECTION INTRAMUSCULAR; INTRAVENOUS at 08:57:00

## 2021-12-27 NOTE — IMAGING NOTE
Assisted Dr. Emery Evans with needle localization of right breast 2 site breast for lumpectomy. Procedure explained and all questions answered. Pt verbalized understanding. Emotional support provided and pt tolerated procedure well with minimal discomfort.  Wire

## 2021-12-27 NOTE — ANESTHESIA PREPROCEDURE EVALUATION
PRE-OP EVALUATION    Patient Name: Hussein Junior    Admit Diagnosis: Breast mass, right [N63.10]    Pre-op Diagnosis: Breast mass, right [N63.10]    WIRE LOCALIZATION EXCISION TWO (2) NODULES RIGHT BREAST     Anesthesia Procedure: WIRE LOCALIZATION Cardiovascular    Negative cardiovascular ROS. Exercise tolerance: good     MET: >4                             (-) angina     (-) ROPER  (-) orthopnea  (-) PND     Endo/Other    Negative endo/other ROS.                               Pulmonary    Negativ

## 2021-12-27 NOTE — ANESTHESIA POSTPROCEDURE EVALUATION
[facility] Patient Status:  Hospital Outpatient Surgery   Age/Gender 39year old female MRN [id number]   Location [address] Attending Odell Richards, [address] [facility] [address] Day # 0 PCP Josefa Flores MD

## 2021-12-27 NOTE — BRIEF OP NOTE
Pre-Operative Diagnosis: Breast mass, right [N63.10]     Post-Operative Diagnosis: Breast mass, right [N63.10]      Procedure Performed:   WIRE LOCALIZATION EXCISION TWO (2) NODULES RIGHT BREAST     Surgeon(s) and Role:     * Estefania Mcbride MD - Primary

## 2021-12-27 NOTE — INTERVAL H&P NOTE
Pre-op Diagnosis: Breast mass, right [N63.10]    The above referenced H&P was reviewed by Katie Serrano MD on 12/27/2021, the patient was examined and no significant changes have occurred in the patient's condition since the H&P was performed.   I discusse

## 2021-12-27 NOTE — ANESTHESIA PROCEDURE NOTES
Airway  Date/Time: 12/27/2021 9:01 AM  Urgency: elective      General Information and Staff    Patient location during procedure: OR  Anesthesiologist: Jonathan Samaniego MD  Performed: anesthesiologist     Indications and Patient Condition  Indications for

## 2021-12-28 NOTE — OPERATIVE REPORT
[facility]    PATIENT'S NAME: Christian Julien PHYSICIAN: Markos White M.D. OPERATING PHYSICIAN: Markos White M.D.    PATIENT ACCOUNT#:   [de-identified]    LOCATION:  [address] [place] [address] EDW 10  MEDICAL RECORD #:   [id number]

## 2022-01-10 DIAGNOSIS — B00.9 HERPES SIMPLEX: ICD-10-CM

## 2022-01-10 RX ORDER — ACYCLOVIR 400 MG/1
TABLET ORAL
Qty: 15 TABLET | Refills: 0 | Status: SHIPPED | OUTPATIENT
Start: 2022-01-10 | End: 2022-04-14 | Stop reason: SDUPTHER

## 2022-02-16 ENCOUNTER — OFFICE VISIT (OUTPATIENT)
Dept: GASTROENTEROLOGY | Age: 42
End: 2022-02-16

## 2022-02-16 ENCOUNTER — LAB SERVICES (OUTPATIENT)
Dept: LAB | Age: 42
End: 2022-02-16

## 2022-02-16 VITALS — BODY MASS INDEX: 21 KG/M2 | HEIGHT: 64 IN | WEIGHT: 123 LBS

## 2022-02-16 DIAGNOSIS — K51.20 ULCERATIVE PROCTITIS WITHOUT COMPLICATION (CMD): ICD-10-CM

## 2022-02-16 DIAGNOSIS — K51.20 ULCERATIVE PROCTITIS WITHOUT COMPLICATION (CMD): Primary | ICD-10-CM

## 2022-02-16 LAB
ALBUMIN SERPL BCG-MCNC: 4.2 G/DL (ref 3.6–5.1)
ALP SERPL-CCNC: 62 U/L (ref 45–130)
ALT SERPL W/O P-5'-P-CCNC: 9 U/L (ref 7–34)
AST SERPL-CCNC: 17 U/L (ref 9–37)
BASOPHIL %: 0.7 % (ref 0–1.2)
BASOPHIL ABSOLUTE #: 0.1 10*3/UL (ref 0–0.1)
BILIRUB SERPL-MCNC: 0.5 MG/DL (ref 0–1)
BUN SERPL-MCNC: 18 MG/DL (ref 7–20)
CALCIUM SERPL-MCNC: 9.1 MG/DL (ref 8.6–10.6)
CHLORIDE SERPL-SCNC: 104 MMOL/L (ref 96–107)
CREAT SERPL-MCNC: 0.8 MG/DL (ref 0.5–1.4)
DIFFERENTIAL TYPE: ABNORMAL
EOSINOPHIL %: 0.9 % (ref 0–10)
EOSINOPHIL ABSOLUTE #: 0.1 10*3/UL (ref 0–0.5)
GFR SERPL CREATININE-BSD FRML MDRD: >60 ML/MIN/{1.73M2}
GFR SERPL CREATININE-BSD FRML MDRD: >60 ML/MIN/{1.73M2}
GLUCOSE SERPL-MCNC: 100 MG/DL (ref 70–200)
HCO3 SERPL-SCNC: 28 MMOL/L (ref 22–32)
HEMATOCRIT: 36.6 % (ref 34–45)
HEMOGLOBIN: 12 G/DL (ref 11.2–15.7)
IMMATURE GRANULOCYTE ABSOLUTE: 0.02 10*3/UL (ref 0–0.05)
IMMATURE GRANULOCYTE PERCENT: 0.2 % (ref 0–0.5)
LYMPH PERCENT: 18.6 % (ref 20.5–51.1)
LYMPHOCYTE ABSOLUTE #: 1.5 10*3/UL (ref 1.2–3.4)
MEAN CORPUSCULAR HGB CONCENTRATION: 32.8 % (ref 32–36)
MEAN CORPUSCULAR HGB: 30.6 PG (ref 27–34)
MEAN CORPUSCULAR VOLUME: 93.4 FL (ref 79–95)
MEAN PLATELET VOLUME: 11.4 FL (ref 8.6–12.4)
MONOCYTE ABSOLUTE #: 0.5 10*3/UL (ref 0.2–0.9)
MONOCYTE PERCENT: 6.2 % (ref 4.3–12.9)
NEUTROPHIL ABSOLUTE #: 5.9 10*3/UL (ref 1.4–6.5)
NEUTROPHIL PERCENT: 73.4 % (ref 34–73.5)
PLATELET COUNT: 251 10*3/UL (ref 150–400)
POTASSIUM SERPL-SCNC: 4.7 MMOL/L (ref 3.5–5.3)
PROT SERPL-MCNC: 6.9 G/DL (ref 6.2–8.1)
RED BLOOD CELL COUNT: 3.92 10*6/UL (ref 3.7–5.2)
RED CELL DISTRIBUTION WIDTH: 13.2 % (ref 11.3–14.8)
SEDIMENTATION RATE, RBC: 3 MM/H (ref 0–20)
SODIUM SERPL-SCNC: 142 MMOL/L (ref 136–146)
WHITE BLOOD CELL COUNT: 8 10*3/UL (ref 4–10)

## 2022-02-16 PROCEDURE — 99214 OFFICE O/P EST MOD 30 MIN: CPT | Performed by: INTERNAL MEDICINE

## 2022-02-16 PROCEDURE — 85652 RBC SED RATE AUTOMATED: CPT | Performed by: INTERNAL MEDICINE

## 2022-02-16 PROCEDURE — 80053 COMPREHEN METABOLIC PANEL: CPT | Performed by: INTERNAL MEDICINE

## 2022-02-16 PROCEDURE — 85025 COMPLETE CBC W/AUTO DIFF WBC: CPT | Performed by: INTERNAL MEDICINE

## 2022-02-16 PROCEDURE — 36415 COLL VENOUS BLD VENIPUNCTURE: CPT | Performed by: INTERNAL MEDICINE

## 2022-02-16 RX ORDER — MESALAMINE 1000 MG/1
1000 SUPPOSITORY RECTAL NIGHTLY
Qty: 30 SUPPOSITORY | Refills: 11 | Status: SHIPPED | OUTPATIENT
Start: 2022-02-16 | End: 2023-06-19 | Stop reason: SDUPTHER

## 2022-03-31 RX ORDER — ESCITALOPRAM OXALATE 10 MG/1
TABLET ORAL
Qty: 90 TABLET | Refills: 0 | Status: SHIPPED | OUTPATIENT
Start: 2022-03-31

## 2022-04-14 ENCOUNTER — OFFICE VISIT (OUTPATIENT)
Dept: DERMATOLOGY | Age: 42
End: 2022-04-14

## 2022-04-14 DIAGNOSIS — B00.9 HERPES SIMPLEX: ICD-10-CM

## 2022-04-14 PROCEDURE — 99213 OFFICE O/P EST LOW 20 MIN: CPT | Performed by: DERMATOLOGY

## 2022-04-14 RX ORDER — ACYCLOVIR 400 MG/1
400 TABLET ORAL 3 TIMES DAILY
Qty: 30 TABLET | Refills: 5 | Status: SHIPPED | OUTPATIENT
Start: 2022-04-14 | End: 2023-09-26

## 2022-06-17 ENCOUNTER — HOSPITAL ENCOUNTER (OUTPATIENT)
Dept: MAMMOGRAPHY | Age: 42
Discharge: HOME | End: 2022-06-17
Attending: SURGERY
Payer: COMMERCIAL

## 2022-06-17 DIAGNOSIS — N63.10 MASS OF RIGHT BREAST, UNSPECIFIED QUADRANT: ICD-10-CM

## 2022-06-17 DIAGNOSIS — D24.2 FIBROADENOMA OF BREAST, LEFT: ICD-10-CM

## 2022-06-17 PROCEDURE — 77066 DX MAMMO INCL CAD BI: CPT | Performed by: SURGERY

## 2022-06-17 PROCEDURE — 77062 BREAST TOMOSYNTHESIS BI: CPT | Performed by: SURGERY

## 2022-08-03 RX ORDER — ESCITALOPRAM OXALATE 10 MG/1
TABLET ORAL
Qty: 90 TABLET | Refills: 0 | Status: SHIPPED | OUTPATIENT
Start: 2022-08-03

## 2022-08-03 NOTE — TELEPHONE ENCOUNTER
Refill provided but is due for visit, please schedule. Will need visit for any future refills. Thanks.

## 2023-02-24 ENCOUNTER — WALK IN (OUTPATIENT)
Dept: URGENT CARE | Age: 43
End: 2023-02-24

## 2023-02-24 VITALS
HEART RATE: 93 BPM | SYSTOLIC BLOOD PRESSURE: 106 MMHG | TEMPERATURE: 98.8 F | BODY MASS INDEX: 21.97 KG/M2 | OXYGEN SATURATION: 99 % | RESPIRATION RATE: 16 BRPM | WEIGHT: 124 LBS | DIASTOLIC BLOOD PRESSURE: 66 MMHG | HEIGHT: 63 IN

## 2023-02-24 DIAGNOSIS — J02.0 STREP PHARYNGITIS: Primary | ICD-10-CM

## 2023-02-24 LAB
INTERNAL PROCEDURAL CONTROLS ACCEPTABLE: YES
S PYO AG THROAT QL IA.RAPID: POSITIVE
TEST LOT EXPIRATION DATE: ABNORMAL
TEST LOT NUMBER: ABNORMAL

## 2023-02-24 PROCEDURE — 87880 STREP A ASSAY W/OPTIC: CPT | Performed by: NURSE PRACTITIONER

## 2023-02-24 PROCEDURE — 99203 OFFICE O/P NEW LOW 30 MIN: CPT | Performed by: NURSE PRACTITIONER

## 2023-02-24 RX ORDER — ESCITALOPRAM OXALATE 10 MG/1
10 TABLET ORAL
COMMUNITY
Start: 2022-03-31

## 2023-02-24 RX ORDER — AZITHROMYCIN 250 MG/1
TABLET, FILM COATED ORAL
Qty: 6 TABLET | Refills: 0 | Status: SHIPPED | OUTPATIENT
Start: 2023-02-24 | End: 2023-03-14 | Stop reason: SDUPTHER

## 2023-02-24 ASSESSMENT — PAIN SCALES - GENERAL: PAINLEVEL: 7

## 2023-03-14 ENCOUNTER — WALK IN (OUTPATIENT)
Dept: URGENT CARE | Age: 43
End: 2023-03-14

## 2023-03-14 VITALS
SYSTOLIC BLOOD PRESSURE: 98 MMHG | TEMPERATURE: 99.5 F | DIASTOLIC BLOOD PRESSURE: 64 MMHG | RESPIRATION RATE: 20 BRPM | OXYGEN SATURATION: 99 % | WEIGHT: 123 LBS | HEART RATE: 88 BPM | HEIGHT: 63 IN | BODY MASS INDEX: 21.79 KG/M2

## 2023-03-14 DIAGNOSIS — J06.9 UPPER RESPIRATORY TRACT INFECTION, UNSPECIFIED TYPE: Primary | ICD-10-CM

## 2023-03-14 PROBLEM — K51.20 ULCERATIVE PROCTITIS WITHOUT COMPLICATION (CMD): Status: ACTIVE | Noted: 2017-05-22

## 2023-03-14 PROCEDURE — 99213 OFFICE O/P EST LOW 20 MIN: CPT | Performed by: NURSE PRACTITIONER

## 2023-03-14 PROCEDURE — 87081 CULTURE SCREEN ONLY: CPT | Performed by: INTERNAL MEDICINE

## 2023-03-17 LAB — S PYO SPEC QL CULT: NORMAL

## 2023-04-13 ENCOUNTER — HOSPITAL ENCOUNTER (OUTPATIENT)
Dept: MAMMOGRAPHY | Age: 43
Discharge: HOME | End: 2023-04-13
Payer: COMMERCIAL

## 2023-04-13 DIAGNOSIS — Z12.31 ENCOUNTER FOR SCREENING MAMMOGRAM FOR MALIGNANT NEOPLASM OF BREAST: ICD-10-CM

## 2023-04-13 PROCEDURE — 77063 BREAST TOMOSYNTHESIS BI: CPT

## 2023-04-13 PROCEDURE — 77067 SCR MAMMO BI INCL CAD: CPT

## 2023-05-03 ENCOUNTER — OFFICE VISIT (OUTPATIENT)
Dept: DERMATOLOGY | Age: 43
End: 2023-05-03

## 2023-05-03 DIAGNOSIS — L81.2 FRECKLES: Primary | ICD-10-CM

## 2023-05-03 PROCEDURE — 99213 OFFICE O/P EST LOW 20 MIN: CPT | Performed by: DERMATOLOGY

## 2023-05-06 NOTE — TELEPHONE ENCOUNTER
1125: Daphne Gaviria returned the breast care coordinator's call. Discussed localization procedure to be done in the [facility] center  prior to surgery Monday, December 27. Procedure explained. Ms. Ari Elias  verbalized understanding.  No quest Home

## 2023-05-23 RX ORDER — ESCITALOPRAM OXALATE 10 MG/1
TABLET ORAL
Qty: 90 TABLET | Refills: 0 | Status: SHIPPED | OUTPATIENT
Start: 2023-05-23

## 2023-06-01 ENCOUNTER — TELEPHONE (OUTPATIENT)
Dept: FAMILY MEDICINE CLINIC | Facility: CLINIC | Age: 43
End: 2023-06-01

## 2023-06-01 DIAGNOSIS — Z13.6 SCREENING FOR CARDIOVASCULAR CONDITION: Primary | ICD-10-CM

## 2023-06-01 NOTE — TELEPHONE ENCOUNTER
Please enter lab orders for the patient's upcoming physical appointment. Physical scheduled: Your appointments     Date & Time Appointment Department [facility])    Jun 07, 2023  2:30 PM CDT Physical - Established with Marzetta Libman, MD [address], [address], [place] ([address])            [facility] [address] [address] [facility] [id number]         [facility])     The patient has been notified to complete fasting labs prior to their physical appointment.

## 2023-06-06 ENCOUNTER — LAB ENCOUNTER (OUTPATIENT)
Dept: LAB | Age: 43
End: 2023-06-06
Attending: STUDENT IN AN ORGANIZED HEALTH CARE EDUCATION/TRAINING PROGRAM
Payer: COMMERCIAL

## 2023-06-06 DIAGNOSIS — Z13.6 SCREENING FOR CARDIOVASCULAR CONDITION: ICD-10-CM

## 2023-06-06 LAB
ANION GAP SERPL CALC-SCNC: 2 MMOL/L (ref 0–18)
BUN BLD-MCNC: 17 MG/DL (ref 7–18)
CALCIUM BLD-MCNC: 8.1 MG/DL (ref 8.5–10.1)
CHLORIDE SERPL-SCNC: 111 MMOL/L (ref 98–112)
CHOLEST SERPL-MCNC: 193 MG/DL (ref ?–200)
CO2 SERPL-SCNC: 25 MMOL/L (ref 21–32)
CREAT BLD-MCNC: 0.94 MG/DL
FASTING PATIENT LIPID ANSWER: YES
FASTING STATUS PATIENT QL REPORTED: YES
GFR SERPLBLD BASED ON 1.73 SQ M-ARVRAT: 77 ML/MIN/1.73M2 (ref 60–?)
GLUCOSE BLD-MCNC: 100 MG/DL (ref 70–99)
HDLC SERPL-MCNC: 88 MG/DL (ref 40–59)
LDLC SERPL CALC-MCNC: 94 MG/DL (ref ?–100)
NONHDLC SERPL-MCNC: 105 MG/DL (ref ?–130)
OSMOLALITY SERPL CALC.SUM OF ELEC: 288 MOSM/KG (ref 275–295)
POTASSIUM SERPL-SCNC: 4.4 MMOL/L (ref 3.5–5.1)
SODIUM SERPL-SCNC: 138 MMOL/L (ref 136–145)
TRIGL SERPL-MCNC: 59 MG/DL (ref 30–149)
VLDLC SERPL CALC-MCNC: 10 MG/DL (ref 0–30)

## 2023-06-06 PROCEDURE — 80048 BASIC METABOLIC PNL TOTAL CA: CPT

## 2023-06-06 PROCEDURE — 80061 LIPID PANEL: CPT

## 2023-06-06 PROCEDURE — 36415 COLL VENOUS BLD VENIPUNCTURE: CPT

## 2023-06-07 ENCOUNTER — OFFICE VISIT (OUTPATIENT)
Dept: FAMILY MEDICINE CLINIC | Facility: CLINIC | Age: 43
End: 2023-06-07
Payer: COMMERCIAL

## 2023-06-07 VITALS
HEART RATE: 72 BPM | OXYGEN SATURATION: 100 % | BODY MASS INDEX: 21.17 KG/M2 | HEIGHT: 64 IN | DIASTOLIC BLOOD PRESSURE: 58 MMHG | WEIGHT: 124 LBS | SYSTOLIC BLOOD PRESSURE: 90 MMHG

## 2023-06-07 DIAGNOSIS — Z00.00 ENCOUNTER FOR ROUTINE HISTORY AND PHYSICAL EXAMINATION: Primary | ICD-10-CM

## 2023-06-07 DIAGNOSIS — F41.9 ANXIETY: ICD-10-CM

## 2023-06-07 PROCEDURE — 3078F DIAST BP <80 MM HG: CPT | Performed by: STUDENT IN AN ORGANIZED HEALTH CARE EDUCATION/TRAINING PROGRAM

## 2023-06-07 PROCEDURE — 3074F SYST BP LT 130 MM HG: CPT | Performed by: STUDENT IN AN ORGANIZED HEALTH CARE EDUCATION/TRAINING PROGRAM

## 2023-06-07 PROCEDURE — 3008F BODY MASS INDEX DOCD: CPT | Performed by: STUDENT IN AN ORGANIZED HEALTH CARE EDUCATION/TRAINING PROGRAM

## 2023-06-07 PROCEDURE — 99396 PREV VISIT EST AGE 40-64: CPT | Performed by: STUDENT IN AN ORGANIZED HEALTH CARE EDUCATION/TRAINING PROGRAM

## 2023-06-07 RX ORDER — ESCITALOPRAM OXALATE 10 MG/1
10 TABLET ORAL EVERY MORNING
Qty: 90 TABLET | Refills: 3 | Status: SHIPPED | OUTPATIENT
Start: 2023-06-07

## 2023-06-19 RX ORDER — MESALAMINE 1000 MG/1
1000 SUPPOSITORY RECTAL NIGHTLY
Qty: 30 SUPPOSITORY | Refills: 5 | Status: SHIPPED | OUTPATIENT
Start: 2023-06-19

## 2023-06-19 RX ORDER — MESALAMINE 1000 MG/1
1000 SUPPOSITORY RECTAL NIGHTLY
Qty: 30 SUPPOSITORY | Refills: 5 | Status: SHIPPED | OUTPATIENT
Start: 2023-06-19 | End: 2023-06-19 | Stop reason: SDUPTHER

## 2023-09-26 DIAGNOSIS — B00.9 HERPES SIMPLEX: ICD-10-CM

## 2023-09-26 RX ORDER — ACYCLOVIR 400 MG/1
400 TABLET ORAL 3 TIMES DAILY
Qty: 30 TABLET | Refills: 0 | Status: SHIPPED | OUTPATIENT
Start: 2023-09-26

## 2023-10-16 ENCOUNTER — WALK IN (OUTPATIENT)
Dept: URGENT CARE | Age: 43
End: 2023-10-16

## 2023-10-16 VITALS
SYSTOLIC BLOOD PRESSURE: 98 MMHG | OXYGEN SATURATION: 99 % | BODY MASS INDEX: 21.79 KG/M2 | TEMPERATURE: 99.1 F | RESPIRATION RATE: 16 BRPM | HEIGHT: 63 IN | DIASTOLIC BLOOD PRESSURE: 60 MMHG | WEIGHT: 123 LBS | HEART RATE: 84 BPM

## 2023-10-16 DIAGNOSIS — J01.90 ACUTE BACTERIAL SINUSITIS: ICD-10-CM

## 2023-10-16 DIAGNOSIS — H10.31 ACUTE CONJUNCTIVITIS OF RIGHT EYE, UNSPECIFIED ACUTE CONJUNCTIVITIS TYPE: ICD-10-CM

## 2023-10-16 DIAGNOSIS — J02.9 SORE THROAT: Primary | ICD-10-CM

## 2023-10-16 DIAGNOSIS — B96.89 ACUTE BACTERIAL SINUSITIS: ICD-10-CM

## 2023-10-16 LAB
INTERNAL PROCEDURAL CONTROLS ACCEPTABLE: YES
S PYO AG THROAT QL IA.RAPID: NEGATIVE
TEST LOT EXPIRATION DATE: NORMAL
TEST LOT NUMBER: NORMAL

## 2023-10-16 PROCEDURE — 87880 STREP A ASSAY W/OPTIC: CPT | Performed by: NURSE PRACTITIONER

## 2023-10-16 PROCEDURE — 99212 OFFICE O/P EST SF 10 MIN: CPT | Performed by: NURSE PRACTITIONER

## 2023-10-16 RX ORDER — TOBRAMYCIN 3 MG/ML
1 SOLUTION/ DROPS OPHTHALMIC EVERY 4 HOURS
Qty: 5 ML | Refills: 0 | Status: SHIPPED | OUTPATIENT
Start: 2023-10-16 | End: 2023-10-21

## 2023-10-16 RX ORDER — AZITHROMYCIN 250 MG/1
TABLET, FILM COATED ORAL
Qty: 6 TABLET | Refills: 0 | Status: SHIPPED | OUTPATIENT
Start: 2023-10-16 | End: 2023-10-21

## 2023-10-16 ASSESSMENT — ENCOUNTER SYMPTOMS
ACTIVITY CHANGE: 0
FOREIGN BODY SENSATION: 0
FATIGUE: 0
PHOTOPHOBIA: 0
EYE ITCHING: 1
EYE PAIN: 0
NAUSEA: 0
APPETITE CHANGE: 0
COUGH: 0
SINUS PAIN: 1
HEADACHES: 0
EYE REDNESS: 0
SORE THROAT: 1
RHINORRHEA: 0
SINUS PRESSURE: 1
CHILLS: 0
DOUBLE VISION: 0
EYE DISCHARGE: 1
CHANGE IN BOWEL HABIT: 0
FEVER: 0
BLURRED VISION: 0
VISUAL CHANGE: 0
VOMITING: 0
SWOLLEN GLANDS: 0
TROUBLE SWALLOWING: 0
WEAKNESS: 0

## 2023-10-16 ASSESSMENT — PAIN SCALES - GENERAL: PAINLEVEL: 3

## 2023-10-16 ASSESSMENT — VISUAL ACUITY: OU: 1

## 2023-11-27 ENCOUNTER — TELEPHONE (OUTPATIENT)
Dept: GASTROENTEROLOGY | Age: 43
End: 2023-11-27

## 2023-11-27 ENCOUNTER — OFFICE VISIT (OUTPATIENT)
Dept: GASTROENTEROLOGY | Age: 43
End: 2023-11-27

## 2023-11-27 DIAGNOSIS — K51.20 ULCERATIVE PROCTITIS WITHOUT COMPLICATION (CMD): Primary | ICD-10-CM

## 2023-11-27 PROCEDURE — 99214 OFFICE O/P EST MOD 30 MIN: CPT | Performed by: INTERNAL MEDICINE

## 2023-11-27 RX ORDER — MESALAMINE 1000 MG/1
1000 SUPPOSITORY RECTAL NIGHTLY
Qty: 30 SUPPOSITORY | Refills: 5 | Status: SHIPPED | OUTPATIENT
Start: 2023-11-27

## 2023-12-31 DIAGNOSIS — B00.9 HERPES SIMPLEX: ICD-10-CM

## 2024-01-02 RX ORDER — ACYCLOVIR 400 MG/1
400 TABLET ORAL 3 TIMES DAILY
Qty: 30 TABLET | Refills: 0 | Status: SHIPPED | OUTPATIENT
Start: 2024-01-02

## 2024-03-04 ENCOUNTER — OFFICE VISIT (OUTPATIENT)
Facility: CLINIC | Age: 44
End: 2024-03-04
Payer: COMMERCIAL

## 2024-03-04 ENCOUNTER — E-ADVICE (OUTPATIENT)
Dept: GASTROENTEROLOGY | Age: 44
End: 2024-03-04

## 2024-03-04 VITALS
HEIGHT: 64 IN | WEIGHT: 123 LBS | BODY MASS INDEX: 21 KG/M2 | SYSTOLIC BLOOD PRESSURE: 96 MMHG | DIASTOLIC BLOOD PRESSURE: 68 MMHG

## 2024-03-04 DIAGNOSIS — N60.99 ATYPICAL LOBULAR HYPERPLASIA (ALH) OF BREAST: ICD-10-CM

## 2024-03-04 DIAGNOSIS — N86: ICD-10-CM

## 2024-03-04 DIAGNOSIS — Z12.31 VISIT FOR SCREENING MAMMOGRAM: Primary | ICD-10-CM

## 2024-03-04 DIAGNOSIS — Z12.4 ENCOUNTER FOR PAPANICOLAOU SMEAR FOR CERVICAL CANCER SCREENING: ICD-10-CM

## 2024-03-04 DIAGNOSIS — Z01.419 ENCOUNTER FOR WELL WOMAN EXAM WITH ROUTINE GYNECOLOGICAL EXAM: ICD-10-CM

## 2024-03-04 DIAGNOSIS — Z00.00 ANNUAL PHYSICAL EXAM: ICD-10-CM

## 2024-03-04 DIAGNOSIS — Z01.419 WOMEN'S ANNUAL ROUTINE GYNECOLOGICAL EXAMINATION: ICD-10-CM

## 2024-03-04 PROCEDURE — 87624 HPV HI-RISK TYP POOLED RSLT: CPT | Performed by: OBSTETRICS & GYNECOLOGY

## 2024-03-04 NOTE — PROGRESS NOTES
GYN H&P     Genetic questionnaire reviewed with the patient and she will be referred for genetic counseling if the questionnaire had any positive results.    The [facility] intake form was also reviewed regarding contraception, menstrual periods, urinary health, and vaginal / sexual health    3/4/2024  2:18 PM    Chief Complaint   Patient presents with    Physical     No concerns         HPI: Cheryl is a 44 year old  Patient's last menstrual period was 2024 (approximate).  (contraception: Vasectomy) here for her annual gyn exam.     She continues to complain of VM symptoms and Menses are lighter but fairly regular. Denies any pelvic or breast complaints.   Satisfied with current contraception.     Previous encounters and chart reviewed.   2023  2:14 PM     Patient presents with:  Physical: Pt here for annual exam not due for pap this year. Thinks shes going into perimenopause  night sweats         HPI: Cheryl is a 43 year old  Patient's last menstrual period was 2023 (exact date).  (contraception: Vasectomy) here for her annual gyn exam.      Reports 2 menstrual cycles in one month again- this happened last 2022 as well. Also reports vasomotor symptoms including night sweats. Reports her menstrual cycles were always heavy after children. Denies bleeding through more than 1 pad in 1 hour or bleeding longer than 9 days. Reports she will have to use ultra tampons on one day of her cycle only.  Denies any pelvic or breast complaints. Not interested in any hormonal intervention at this time. Satisfied with current contraception.      Gets blood work completed annually with PCP.  OB History    Para Term  AB Living   4 3 3   1 3   SAB IAB Ectopic Multiple Live Births   1     0 3      # Outcome Date GA Lbr Yao/2nd Weight Sex Delivery Anes PTL Lv   4 Term 11/25/15 39w1d 01:28 / 00:27 7 lb 8.3 oz (3.41 kg) M NORMAL SPONT EPI N STACIA      Complications:  Variable decelerations, Late decelerations, Other - see comments   3 Term 08/18/12 40w0d   M NORMAL SPONT EPI  STACIA   2 Term 06/20/10 41w0d   F    STACIA   1 SAB 06/28/09 4w0d              GYN hx:   Menarche: 11  Period Cycle (Days): 28  Period Duration (Days): 7  Period Flow: heavy day 2-4  Use of Birth Control (if yes, specify type): Vasectomy  Pap Date: 09/03/21  Pap Result Notes: Neg/Neg     (3/11/2017 neg,neg  (11/2013 neg,neg  10/2012 neg  01/2012 neg)  Follow Up Recommendation: due      Past Medical History:   Diagnosis Date    Anxiety state, unspecified     Herpes simplex without mention of complication 11/12/11    Proctitis     Visual impairment     contacts     Past Surgical History:   Procedure Laterality Date    COLONOSCOPY WITH BIOPSY  9/30/13    BALDO LOCALIZATION WIRE 1 SITE RIGHT (CPT=19281) Right 12/27/2021    NEEDLE BIOPSY RIGHT Right 12/27/2021    2 site bx, fibroadenoma, ALH    OTHER SURGICAL HISTORY  12/27/2021    wire loc and excision 2 R breast masses     Allergies   Allergen Reactions    Penicillin G HIVES     Current Outpatient Medications on File Prior to Visit   Medication Sig Dispense Refill    escitalopram 10 MG Oral Tab Take 1 tablet (10 mg total) by mouth every morning. 90 tablet 3    Ergocalciferol (VITAMIN D OR) Take by mouth daily.      ALPRAZolam 0.25 MG Oral Tab Take 1 tablet (0.25 mg total) by mouth daily as needed for Anxiety. 30 tablet 0    acyclovir 400 MG Oral Tab 1 tablet (400 mg total) as needed.  0    Multiple Vitamins-Minerals (MULTIPLE VITAMINS/WOMENS) Oral Tab Take 1 tablet by mouth daily.      mesalamine 1000 MG Rectal Suppos Place 1 suppository (1,000 mg total) rectally 3 (three) times a week. Pt takes 3 times a week at night.       No current facility-administered medications on file prior to visit.     Family History   Problem Relation Age of Onset    Cancer Mother         Skin    Other (Aneurysm of unspecified site) Mother     Cancer Father         Prostate    Other  (CABG) Father     Other (Tuberculosis) Father      Social History     Socioeconomic History    Marital status:      Spouse name: Not on file    Number of children: Not on file    Years of education: Not on file    Highest education level: Not on file   Occupational History    Occupation: Collateral reviewer   Tobacco Use    Smoking status: Former     Types: Cigarettes     Quit date: 2008     Years since quittin.1    Smokeless tobacco: Never   Vaping Use    Vaping Use: Never used   Substance and Sexual Activity    Alcohol use: Yes     Alcohol/week: 5.0 standard drinks of alcohol     Types: 5 Standard drinks or equivalent per week    Drug use: No    Sexual activity: Yes     Partners: Male     Birth control/protection: Vasectomy   Other Topics Concern     Service Not Asked    Blood Transfusions No    Caffeine Concern Yes     Comment:  2 cups daily    Occupational Exposure Not Asked    Hobby Hazards Not Asked    Sleep Concern Not Asked    Stress Concern Yes    Weight Concern Not Asked    Special Diet Not Asked    Back Care Not Asked    Exercise Yes     Comment: 2 times/week power walk    Bike Helmet Not Asked    Seat Belt Yes    Self-Exams No   Social History Narrative    Not on file     Social Determinants of Health     Financial Resource Strain: Not on file   Food Insecurity: Not on file   Transportation Needs: Not on file   Physical Activity: Not on file   Stress: Not on file   Social Connections: Not on file   Housing Stability: Not on file       ROS:     Review of Systems:  General: denies fevers, chills, fatigue and malaise.   Eyes: no visual changes, denies headaches  ENT: no complaints, denies earaches, runny nose, epistaxis, throat pain or sore throat  Respiratory: denies SOB, dyspnea, cough or wheezing  Cardiovascular: denies chest pain, palpitations, exercise intolerance   GI: denies abdominal pain, diarrhea, constipation  : no complaints, denies dysuria, increased urinary  frequency. Menses regular, no dysmenorrhea, no menorrhagia, no dyspareunia   Hematological/lymphatic: denies history of excessive bleeding or bruising, denies dizziness, lightheadedness.   Breast: denies rashes, skin changes, pain, lumps or discharge   Psychiatric: denies depression, changes in sleep patterns, anxiety  Endocrine: denies hot or cold intolerance, mood changes   Neurological: denies changes in sight, smell, hearing or taste. Denies seizures or tremors  Immunological: denies allergies, denies anaphylaxis, or swollen lymph nodes  Musculoskeletal: denies joint pain, morning stiffness, decreased range of motion         O BP 96/68   Ht 64\"   Wt 123 lb (55.8 kg)   LMP 02/19/2024 (Approximate)   BMI 21.11 kg/m²         Wt Readings from Last 6 Encounters:   03/04/24 123 lb (55.8 kg)   06/07/23 124 lb (56.2 kg)   02/24/23 122 lb (55.3 kg)   12/27/21 122 lb (55.3 kg)   12/08/21 123 lb (55.8 kg)   09/24/21 122 lb 6 oz (55.5 kg)     Exam:   GENERAL: well developed, well nourished, in no apparent distress, oriented.  SKIN: no rashes, no suspicious lesions  HEENT: normal  NECK: supple; no thyromegaly, no adenopathy  LUNGS: clear to auscultation  CARDIOVASCULAR: normal S1, S2, RRR  BREASTS: soft, nontender, no palpable masses or nodes, no nipple discharge, no skin changes, no axillary adenopathy  ABDOMEN: no scars,  soft, non distended; non tender, no masses  PELVIC: External Genitalia: Normal appearing, no lesions.    Vagina: normal pink mucosa, no lesions, normal clear discharge.    Bladder well supported.  No  anterior or posterior hernias    Cervix: ectopy multiparous, no lesions , No CMT     Uterus: AVAF, mobile, non tender, normal size    Adnexa: non tender, no masses, normal size    Rectal: deferred  EXTREMITIES:  non tender without edema        A/P: Patient is 44 year old female with no complaints. Here for well woman exam.            Patient counseled on:    Diet/exercise.      Self Breast Exams     Safe  sex practices / and living environment     Vaccines:  Annual Flu, Tdap +/- Gardasil not recommended (up to 45 yrs).      Pneumococcal at 65 yrs old, Shingles at 60 yrs old          Pap: done  GC/Chlamydia:  na  Mammogram:  negative,  4/2023  Dexa:  na  Colonoscopy / Cologuard:   na  Lipid / Cholesterol:    Collected 6/6/2023 11:13 AM       Status: Final result       Dx: Screening for cardiovascular condition    1 Result Note       1 Patient Communication      Component  Ref Range & Units 6/6/23 11:13 AM   Cholesterol, Total  <200 mg/dL 193   Comment: Desirable  <200 mg/dL  Borderline  200-239 mg/dL  High      >=240 mg/dL     HDL Cholesterol  40 - 59 mg/dL 88 High    Comment: Interpretive Information:  An HDL cholesterol <40 mg/dL is low and constitutes a coronary heart disease risk factor. An HDL cholesterol >60 mg/dL is a negative risk factor for coronary heart disease.     Triglycerides  30 - 149 mg/dL 59   Comment: Reference interval for fasting triglycerides  Desirable: <150 mg/dL  Borderline: 150-199 mg/dL  High: 200-499 mg/dL  Very High: >=500 mg/dL       LDL Cholesterol  <100 mg/dL 94   Comment: Desirable <100 mg/dL  Borderline 100-129 mg/dL  High     >=130mg/dL     VLDL  0 - 30 mg/dL 10   Non HDL Chol  <130 mg/dL 105   Comment: Desirable  <130 mg/dL  Borderline  130-159 mg/dL  High        160-189 mg/dL      Very high >=190 mg/dL               Meds This Visit:    Requested Prescriptions      No prescriptions requested or ordered in this encounter       1. Visit for screening mammogram  - [facility] MAK 2D+3D SCREENING BILAT (CPT=77067/52055); Future  - [facility] MAK 2D+3D SCREENING BILAT (CPT=77067/57443); Future    2. Women's annual routine gynecological examination  - Hpv High Risk , Thin Prep Collect; Future  - ThinPrep PAP Smear B; Future  - Hpv High Risk , Thin Prep Collect  - ThinPrep PAP Smear B    3. Encounter for well woman exam with routine gynecological exam  - Hpv High Risk , Thin Prep Collect; Future  -  ThinPrep PAP Smear B; Future  - Hpv High Risk , Thin Prep Collect  - ThinPrep PAP Smear B    4. Encounter for Papanicolaou smear for cervical cancer screening  - Hpv High Risk , Thin Prep Collect; Future  - ThinPrep PAP Smear B; Future  - Hpv High Risk , Thin Prep Collect  - ThinPrep PAP Smear B    5. Ectopy of cervix    6. Annual physical exam    7. Atypical lobular hyperplasia (ALH) of breast  - Genetic Counselor Referral - [facility] ([place])    MMG's q 6 months per gen. Surgery    Genetic counselor or BRCA testing??      Return in about 1 year (around 3/4/2025) for Well Woman Exam.    Kahlil Douglas MD   3/4/2024  2:18 PM

## 2024-03-05 LAB — HPV I/H RISK 1 DNA SPEC QL NAA+PROBE: NEGATIVE

## 2024-03-08 LAB
.: NORMAL
.: NORMAL

## 2024-03-22 DIAGNOSIS — B00.9 HERPES SIMPLEX: ICD-10-CM

## 2024-03-25 RX ORDER — ACYCLOVIR 400 MG/1
400 TABLET ORAL 3 TIMES DAILY
Qty: 30 TABLET | Refills: 0 | Status: SHIPPED | OUTPATIENT
Start: 2024-03-25

## 2024-04-17 ENCOUNTER — HOSPITAL ENCOUNTER (OUTPATIENT)
Dept: MAMMOGRAPHY | Age: 44
Discharge: HOME | End: 2024-04-17
Attending: OBSTETRICS & GYNECOLOGY
Payer: COMMERCIAL

## 2024-04-17 DIAGNOSIS — Z12.31 VISIT FOR SCREENING MAMMOGRAM: ICD-10-CM

## 2024-04-17 PROCEDURE — 77067 SCR MAMMO BI INCL CAD: CPT | Performed by: OBSTETRICS & GYNECOLOGY

## 2024-04-17 PROCEDURE — 77063 BREAST TOMOSYNTHESIS BI: CPT | Performed by: OBSTETRICS & GYNECOLOGY

## 2024-05-02 ENCOUNTER — APPOINTMENT (OUTPATIENT)
Dept: DERMATOLOGY | Age: 44
End: 2024-05-02

## 2024-05-28 ENCOUNTER — E-ADVICE (OUTPATIENT)
Dept: GASTROENTEROLOGY | Age: 44
End: 2024-05-28

## 2024-05-31 ENCOUNTER — ANESTHESIA EVENT (OUTPATIENT)
Dept: GASTROENTEROLOGY | Age: 44
End: 2024-05-31

## 2024-06-03 ENCOUNTER — APPOINTMENT (OUTPATIENT)
Dept: GASTROENTEROLOGY | Age: 44
End: 2024-06-03
Attending: INTERNAL MEDICINE

## 2024-06-03 ENCOUNTER — ANESTHESIA (OUTPATIENT)
Dept: GASTROENTEROLOGY | Age: 44
End: 2024-06-03

## 2024-06-03 VITALS
RESPIRATION RATE: 16 BRPM | HEIGHT: 63 IN | BODY MASS INDEX: 21.79 KG/M2 | OXYGEN SATURATION: 98 % | SYSTOLIC BLOOD PRESSURE: 95 MMHG | HEART RATE: 87 BPM | WEIGHT: 123 LBS | DIASTOLIC BLOOD PRESSURE: 74 MMHG | TEMPERATURE: 97.5 F

## 2024-06-03 DIAGNOSIS — K63.5 POLYP OF COLON, UNSPECIFIED PART OF COLON, UNSPECIFIED TYPE: ICD-10-CM

## 2024-06-03 DIAGNOSIS — Z87.19 HISTORY OF COLITIS: ICD-10-CM

## 2024-06-03 LAB
B-HCG UR QL: NEGATIVE
INTERNAL PROCEDURAL CONTROLS ACCEPTABLE: YES
TEST LOT EXPIRATION DATE: NORMAL
TEST LOT NUMBER: NORMAL

## 2024-06-03 PROCEDURE — 45380 COLONOSCOPY AND BIOPSY: CPT | Performed by: INTERNAL MEDICINE

## 2024-06-03 RX ORDER — SODIUM CHLORIDE, SODIUM LACTATE, POTASSIUM CHLORIDE, CALCIUM CHLORIDE 600; 310; 30; 20 MG/100ML; MG/100ML; MG/100ML; MG/100ML
INJECTION, SOLUTION INTRAVENOUS CONTINUOUS
Status: DISCONTINUED | OUTPATIENT
Start: 2024-06-03 | End: 2024-06-05 | Stop reason: HOSPADM

## 2024-06-03 RX ORDER — LIDOCAINE HYDROCHLORIDE 10 MG/ML
5 INJECTION, SOLUTION INFILTRATION; PERINEURAL PRN
Status: DISCONTINUED | OUTPATIENT
Start: 2024-06-03 | End: 2024-06-05 | Stop reason: HOSPADM

## 2024-06-03 RX ORDER — DEXTROSE MONOHYDRATE 50 MG/ML
INJECTION, SOLUTION INTRAVENOUS CONTINUOUS PRN
Status: DISCONTINUED | OUTPATIENT
Start: 2024-06-03 | End: 2024-06-05 | Stop reason: HOSPADM

## 2024-06-03 RX ORDER — DEXTROSE MONOHYDRATE 25 G/50ML
25 INJECTION, SOLUTION INTRAVENOUS PRN
Status: DISCONTINUED | OUTPATIENT
Start: 2024-06-03 | End: 2024-06-05 | Stop reason: HOSPADM

## 2024-06-03 RX ORDER — SODIUM CHLORIDE 9 MG/ML
INJECTION, SOLUTION INTRAVENOUS CONTINUOUS
Status: DISCONTINUED | OUTPATIENT
Start: 2024-06-03 | End: 2024-06-05 | Stop reason: HOSPADM

## 2024-06-03 RX ORDER — NICOTINE POLACRILEX 4 MG
30 LOZENGE BUCCAL
Status: DISCONTINUED | OUTPATIENT
Start: 2024-06-03 | End: 2024-06-05 | Stop reason: HOSPADM

## 2024-06-03 RX ORDER — LIDOCAINE HYDROCHLORIDE 10 MG/ML
INJECTION, SOLUTION INFILTRATION; PERINEURAL PRN
Status: DISCONTINUED | OUTPATIENT
Start: 2024-06-03 | End: 2024-06-03

## 2024-06-03 RX ORDER — PROPOFOL 10 MG/ML
INJECTION, EMULSION INTRAVENOUS PRN
Status: DISCONTINUED | OUTPATIENT
Start: 2024-06-03 | End: 2024-06-03

## 2024-06-03 RX ADMIN — PROPOFOL 60 MG: 10 INJECTION, EMULSION INTRAVENOUS at 13:24

## 2024-06-03 RX ADMIN — SODIUM CHLORIDE, SODIUM LACTATE, POTASSIUM CHLORIDE, CALCIUM CHLORIDE: 600; 310; 30; 20 INJECTION, SOLUTION INTRAVENOUS at 12:35

## 2024-06-03 RX ADMIN — LIDOCAINE HYDROCHLORIDE 30 MG: 10 INJECTION, SOLUTION INFILTRATION; PERINEURAL at 13:24

## 2024-06-03 ASSESSMENT — ACTIVITIES OF DAILY LIVING (ADL)
ADL_BEFORE_ADMISSION: INDEPENDENT
DRESSING: INDEPENDENT
FEEDING: INDEPENDENT
CONTINENCE: INDEPENDENT
BATHING: INDEPENDENT
NEEDS_ASSIST: NO
ADL_SCORE: 24
HISTORY OF FALLING IN THE LAST YEAR (PRIOR TO ADMISSION): NO
TOILETING: INDEPENDENT
TRANSFERRING: INDEPENDENT

## 2024-06-03 ASSESSMENT — PAIN SCALES - GENERAL
PAINLEVEL_OUTOF10: 0
PAINLEVEL_OUTOF10: 0

## 2024-06-11 LAB
ASR DISCLAIMER: NORMAL
CASE RPRT: NORMAL
CLINICAL INFO: NORMAL
PATH REPORT.FINAL DX SPEC: NORMAL
PATH REPORT.FINAL DX SPEC: NORMAL
PATH REPORT.GROSS SPEC: NORMAL

## 2024-06-24 ENCOUNTER — CLINICAL DOCUMENTATION (OUTPATIENT)
Dept: GASTROENTEROLOGY | Age: 44
End: 2024-06-24

## 2024-06-24 LAB — COLONOSCOPY STUDY: NORMAL

## 2024-07-01 DIAGNOSIS — B00.9 HERPES SIMPLEX: ICD-10-CM

## 2024-07-01 RX ORDER — ACYCLOVIR 400 MG/1
400 TABLET ORAL 3 TIMES DAILY
Qty: 30 TABLET | Refills: 0 | Status: SHIPPED | OUTPATIENT
Start: 2024-07-01

## 2024-08-01 ENCOUNTER — APPOINTMENT (OUTPATIENT)
Dept: DERMATOLOGY | Age: 44
End: 2024-08-01

## 2024-09-06 RX ORDER — ALPRAZOLAM 0.25 MG/1
0.25 TABLET ORAL DAILY PRN
Qty: 30 TABLET | Refills: 0 | Status: CANCELLED | OUTPATIENT
Start: 2024-09-06

## 2024-09-06 NOTE — TELEPHONE ENCOUNTER
Patient requesting medication refill on  ALPRAZolam 0.25 MG Oral Tab       Patient stated she out of meds       Send to   [id number] - [place], [place] - [address]

## 2024-09-06 NOTE — TELEPHONE ENCOUNTER
Refill request for:    Requested Prescriptions      No prescriptions requested or ordered in this encounter        Last Prescribed Quantity Refills   2/16/2018 30 0     LOV 6/7/23     Patient was asked to follow-up in: Follow-up not documented in note    Appointment scheduled: Visit date not found    Medication not on protocol.     Patient has not been seen in over one year. Please assist pt in schedling an appointment.    Routed to front staff.

## 2024-09-11 ENCOUNTER — V-VISIT (OUTPATIENT)
Dept: URGENT CARE | Age: 44
End: 2024-09-11

## 2024-09-11 VITALS
HEART RATE: 99 BPM | WEIGHT: 123 LBS | HEIGHT: 63 IN | TEMPERATURE: 98.9 F | DIASTOLIC BLOOD PRESSURE: 62 MMHG | RESPIRATION RATE: 16 BRPM | OXYGEN SATURATION: 98 % | BODY MASS INDEX: 21.79 KG/M2 | SYSTOLIC BLOOD PRESSURE: 107 MMHG

## 2024-09-11 DIAGNOSIS — J06.9 ACUTE URI: Primary | ICD-10-CM

## 2024-09-11 DIAGNOSIS — H10.023 OTHER MUCOPURULENT CONJUNCTIVITIS OF BOTH EYES: ICD-10-CM

## 2024-09-11 PROCEDURE — 99213 OFFICE O/P EST LOW 20 MIN: CPT | Performed by: NURSE PRACTITIONER

## 2024-09-11 RX ORDER — TOBRAMYCIN 3 MG/ML
1 SOLUTION/ DROPS OPHTHALMIC EVERY 4 HOURS
Qty: 5 ML | Refills: 0 | Status: SHIPPED | OUTPATIENT
Start: 2024-09-11 | End: 2024-09-16

## 2024-09-11 ASSESSMENT — ENCOUNTER SYMPTOMS
CONSTITUTIONAL NEGATIVE: 1
WHEEZING: 0
ABDOMINAL PAIN: 0
CHEST TIGHTNESS: 0
FATIGUE: 0
NEUROLOGICAL NEGATIVE: 1
GASTROINTESTINAL NEGATIVE: 1
SORE THROAT: 0
RHINORRHEA: 1
NAUSEA: 0
COUGH: 1
SINUS PAIN: 0
SHORTNESS OF BREATH: 0
ALLERGIC/IMMUNOLOGIC NEGATIVE: 1
FEVER: 0
VOICE CHANGE: 0
SINUS PRESSURE: 1
HEADACHES: 0
TROUBLE SWALLOWING: 0
VOMITING: 0

## 2024-09-11 ASSESSMENT — PAIN SCALES - GENERAL: PAINLEVEL: 0

## 2024-10-02 DIAGNOSIS — F41.9 ANXIETY: ICD-10-CM

## 2024-10-05 NOTE — TELEPHONE ENCOUNTER
Requested Prescriptions     Pending Prescriptions Disp Refills    ESCITALOPRAM 10 MG Oral Tab [Pharmacy Med Name: Escitalopram Oxalate 10 Mg Tab Auro] 90 tablet 0     Sig: Take 1 tablet (10 mg total) by mouth every morning.     LOV-9/25/24    Patient was asked to follow-up in: Follow-up not documented in note    Appointment scheduled: Visit date not found     Medication refilled per protocol.

## 2024-10-06 RX ORDER — ESCITALOPRAM OXALATE 10 MG/1
10 TABLET ORAL EVERY MORNING
Qty: 90 TABLET | Refills: 3 | Status: SHIPPED | OUTPATIENT
Start: 2024-10-06

## 2024-11-06 ENCOUNTER — TELEPHONE (OUTPATIENT)
Facility: CLINIC | Age: 44
End: 2024-11-06

## 2024-11-06 DIAGNOSIS — Z98.890 HISTORY OF BREAST LUMP/MASS EXCISION: Primary | ICD-10-CM

## 2024-11-06 NOTE — TELEPHONE ENCOUNTER
Radiology called in regards to patient wanting to schedule appointment for 6 month repeat mammogram.  Per their records patient to have routine mammogram in 12 months.     Called patient to inform that records from last 3 mammograms recommended routine mammogram in 12 months.    Patient reports that since she had a mass removed from right breast that her surgeon and Dr. Douglas wanted her to have mammograms every 6 months, patient requested to have Dr. Douglas review records and verify that she indeed it every 6 months. Informed that he is out of the office today but will check 11/7 and give her a call.     Laura MAGUIRE, CMA

## 2024-11-08 ENCOUNTER — TELEPHONE (OUTPATIENT)
Facility: CLINIC | Age: 44
End: 2024-11-08

## 2024-11-08 NOTE — TELEPHONE ENCOUNTER
Spoke with patient to inform Dr. Douglas has placed the order for the mammogram repeat for 6 months as previously indicated by surgeon Dr. Christine. Advised that since it has been since 2022 to follow up with Dr. Christine to confirm for how long she has to do the 6m follow up on mammogram. Patient voiced understanding and will give them a call.

## 2024-12-04 ENCOUNTER — HOSPITAL ENCOUNTER (OUTPATIENT)
Dept: MAMMOGRAPHY | Age: 44
Discharge: HOME | End: 2024-12-04
Attending: OBSTETRICS & GYNECOLOGY
Payer: COMMERCIAL

## 2024-12-04 DIAGNOSIS — Z98.890 HISTORY OF BREAST LUMP/MASS EXCISION: ICD-10-CM

## 2024-12-04 PROCEDURE — 77062 BREAST TOMOSYNTHESIS BI: CPT | Performed by: OBSTETRICS & GYNECOLOGY

## 2024-12-04 PROCEDURE — 77066 DX MAMMO INCL CAD BI: CPT | Performed by: OBSTETRICS & GYNECOLOGY

## 2025-02-03 ENCOUNTER — TELEPHONE (OUTPATIENT)
Facility: CLINIC | Age: 45
End: 2025-02-03

## 2025-02-08 DIAGNOSIS — B00.9 HERPES SIMPLEX: ICD-10-CM

## 2025-02-10 RX ORDER — ACYCLOVIR 400 MG/1
400 TABLET ORAL 3 TIMES DAILY
Qty: 30 TABLET | Refills: 0 | OUTPATIENT
Start: 2025-02-10

## 2025-02-11 DIAGNOSIS — B00.9 HERPES SIMPLEX: ICD-10-CM

## 2025-02-11 RX ORDER — ACYCLOVIR 400 MG/1
400 TABLET ORAL 3 TIMES DAILY
Qty: 30 TABLET | Refills: 0 | OUTPATIENT
Start: 2025-02-11

## 2025-02-15 DIAGNOSIS — B00.9 HERPES SIMPLEX: ICD-10-CM

## 2025-02-17 ENCOUNTER — TELEPHONE (OUTPATIENT)
Facility: CLINIC | Age: 45
End: 2025-02-17

## 2025-02-17 RX ORDER — ACYCLOVIR 400 MG/1
400 TABLET ORAL 3 TIMES DAILY
Qty: 30 TABLET | Refills: 0 | Status: CANCELLED | OUTPATIENT
Start: 2025-02-17

## 2025-02-17 RX ORDER — ACYCLOVIR 400 MG/1
400 TABLET ORAL 3 TIMES DAILY
Qty: 30 TABLET | Refills: 0 | Status: SHIPPED | OUTPATIENT
Start: 2025-02-17

## 2025-02-17 RX ORDER — ACYCLOVIR 400 MG/1
400 TABLET ORAL 3 TIMES DAILY
Qty: 30 TABLET | Refills: 0 | OUTPATIENT
Start: 2025-02-17

## 2025-03-19 ENCOUNTER — OFFICE VISIT (OUTPATIENT)
Facility: CLINIC | Age: 45
End: 2025-03-19
Payer: COMMERCIAL

## 2025-03-19 VITALS
DIASTOLIC BLOOD PRESSURE: 62 MMHG | SYSTOLIC BLOOD PRESSURE: 90 MMHG | WEIGHT: 124 LBS | HEIGHT: 64 IN | BODY MASS INDEX: 21.17 KG/M2

## 2025-03-19 DIAGNOSIS — Z01.419 ENCOUNTER FOR WELL WOMAN EXAM WITH ROUTINE GYNECOLOGICAL EXAM: Primary | ICD-10-CM

## 2025-03-19 DIAGNOSIS — N60.99 ATYPICAL LOBULAR HYPERPLASIA (ALH) OF BREAST: ICD-10-CM

## 2025-03-19 DIAGNOSIS — N95.1 PERIMENOPAUSAL SYMPTOMS: ICD-10-CM

## 2025-03-19 DIAGNOSIS — N93.9 ABNORMAL UTERINE BLEEDING (AUB): ICD-10-CM

## 2025-03-19 DIAGNOSIS — Z12.31 VISIT FOR SCREENING MAMMOGRAM: ICD-10-CM

## 2025-03-19 PROCEDURE — 3008F BODY MASS INDEX DOCD: CPT

## 2025-03-19 PROCEDURE — 3078F DIAST BP <80 MM HG: CPT

## 2025-03-19 PROCEDURE — 3074F SYST BP LT 130 MM HG: CPT

## 2025-03-19 PROCEDURE — 99396 PREV VISIT EST AGE 40-64: CPT

## 2025-03-19 NOTE — PROGRESS NOTES
GYN H&P     Genetic questionnaire reviewed with the patient and she will be referred for genetic counseling if the questionnaire had any positive results.    The [facility] intake form was also reviewed regarding contraception, menstrual periods, urinary health, and vaginal / sexual health    3/19/2025  1:53 PM    Chief Complaint   Patient presents with    Physical     Pt reports longer periods 2 weeks on period 3 weeks off, anxiety and acne increased as well as night sweats x 1 year       HPI: Cheryl is a 45 year old  Patient's last menstrual period was 2025 (approximate).  (contraception: vasectomy) here for her annual gyn exam.     Pt reports for the last 12 months she has been experiencing 14 days of menstrual bleeding. She will experience 7 days of bleeding, have one day without bleeding , and then start bleeding again for 7 days. She then has a 3 week window of when she does not experience the periods. With the bleeding she has experienced anxiety and acne which she takes Lexapro for as prescribed by her PCP, but wants to increase dose for efficacy. OTC measures for her acne which helped. Has night sweats during the days of her period. Cramps have been consistent with past baseline periods. Denies any pelvic or breast complaints.  Satisfied with current contraception. Not interested in any hormonal therapy, considering ablation.  -Kvng GILBERT    Previous encounters and chart reviewed.     OB History    Para Term  AB Living   4 3 3   1 3   SAB IAB Ectopic Multiple Live Births   1     0 3      # Outcome Date GA Lbr Yao/2nd Weight Sex Type Anes PTL Lv   4 Term 11/25/15 39w1d 01: 00:27 7 lb 8.3 oz (3.41 kg) M NORMAL SPONT EPI N STACIA      Complications: Variable decelerations, Late decelerations, Other - see comments   3 Term 12 40w0d   M NORMAL SPONT EPI  STACIA   2 Term 06/20/10 41w0d   F    STACIA   1 SAB 09 4w0d              GYN hx:   Menarche:  11  Period Cycle (Days): \"back to back' per patient  Period Duration (Days): 14 days  Period Flow: heavy day 2-4  Use of Birth Control (if yes, specify type): Vasectomy  Pap Date: 21  Pap Result Notes: 24 neg/neg; 9/3/21 Neg/Neg     (3/11/2017 neg,neg  (2013 neg,neg  10/2012 neg  2012 neg)  Follow Up Recommendation: [id number]      Past Medical History:    Anxiety state, unspecified    Herpes simplex without mention of complication    Proctitis    Visual impairment    contacts     Past Surgical History:   Procedure Laterality Date    Colonoscopy with biopsy  13    Olivia localization wire 1 site right (cpt=19281) Right 2021    Needle biopsy right Right 2021    2 site bx, fibroadenoma, ALH    Other surgical history  2021    wire loc and excision 2 R breast masses     Allergies[1]  Medications Ordered Prior to Encounter[2]  Family History   Problem Relation Age of Onset    Cancer Mother         Skin    Other (Aneurysm of unspecified site) Mother     Cancer Father         Prostate    Other (CABG) Father     Other (Tuberculosis) Father      Social History     Socioeconomic History    Marital status:      Spouse name: Not on file    Number of children: Not on file    Years of education: Not on file    Highest education level: Not on file   Occupational History    Occupation: Collateral reviewer   Tobacco Use    Smoking status: Former     Current packs/day: 0.00     Types: Cigarettes     Quit date: 2008     Years since quittin.1    Smokeless tobacco: Never   Vaping Use    Vaping status: Never Used   Substance and Sexual Activity    Alcohol use: Yes     Alcohol/week: 5.0 standard drinks of alcohol     Types: 5 Standard drinks or equivalent per week    Drug use: No    Sexual activity: Yes     Partners: Male     Birth control/protection: Vasectomy   Other Topics Concern     Service Not Asked    Blood Transfusions No    Caffeine Concern Yes     Comment:  2 cups daily     Occupational Exposure Not Asked    Hobby Hazards Not Asked    Sleep Concern Not Asked    Stress Concern Yes    Weight Concern Not Asked    Special Diet Not Asked    Back Care Not Asked    Exercise Yes     Comment: 2 times/week power walk    Bike Helmet Not Asked    Seat Belt Yes    Self-Exams No   Social History Narrative    Not on file     Social Drivers of Health     Food Insecurity: No Food Insecurity (3/19/2025)    NCSS - Food Insecurity     Worried About Running Out of Food in the Last Year: No     Ran Out of Food in the Last Year: No   Transportation Needs: No Transportation Needs (3/19/2025)    NCSS - Transportation     Lack of Transportation: No   Stress: Not on file   Housing Stability: Not At Risk (3/19/2025)    NCSS - Housing/Utilities     Has Housing: Yes     Worried About Losing Housing: No     Unable to Get Utilities: No       ROS:     Review of Systems:  General: denies fevers, chills, fatigue and malaise.   Eyes: no visual changes, denies headaches  ENT: no complaints, denies earaches, runny nose, epistaxis, throat pain or sore throat  Respiratory: denies SOB, dyspnea, cough or wheezing  Cardiovascular: denies chest pain, palpitations, exercise intolerance   GI: denies abdominal pain, diarrhea, constipation  : Increased period days, denies dysuria, increased urinary frequency. Menses irregular, no dysmenorrhea, no menorrhagia, no dyspareunia   Hematological/lymphatic: denies history of excessive bleeding or bruising, denies dizziness, lightheadedness.   Breast: denies rashes, skin changes, pain, lumps or discharge   Psychiatric: has anxiety, denies depression, changes in sleep patterns  Endocrine: denies hot or cold intolerance, mood changes  Neurological: denies changes in sight, smell, hearing or taste. Denies seizures or tremors  Immunological: denies allergies, denies anaphylaxis, or swollen lymph nodes  Musculoskeletal: denies joint pain, morning stiffness, decreased range of motion          O BP 90/62   Ht 64\"   Wt 124 lb (56.2 kg)   LMP 02/19/2025 (Approximate)   BMI 21.28 kg/m²         Wt Readings from Last 6 Encounters:   03/19/25 124 lb (56.2 kg)   09/25/24 123 lb (55.8 kg)   03/04/24 123 lb (55.8 kg)   06/07/23 124 lb (56.2 kg)   02/24/23 122 lb (55.3 kg)   12/27/21 122 lb (55.3 kg)     Exam:   GENERAL: well developed, well nourished, in no apparent distress, oriented.  SKIN: no rashes, no suspicious lesions  HEENT: normal  NECK: supple; no thyromegaly, no adenopathy  LUNGS: clear to auscultation  CARDIOVASCULAR: normal S1, S2, RRR  BREASTS: s/p lumpectomy scar on right breast, soft, nontender, no palpable masses or nodes, no nipple discharge, no skin changes, no axillary adenopathy  ABDOMEN: no scars,  soft, non distended; non tender, no masses  PELVIC: External Genitalia: Normal appearing, no lesions.    Vagina: normal pink mucosa, no lesions, normal clear discharge.    Bladder well supported.  No  anterior or posterior hernias    Cervix: multiparous, no lesions , No CMT     Uterus: AVAF, mobile, non tender, normal size    Adnexa: non tender, no masses, normal size    Rectal: deferred  EXTREMITIES:  non tender without edema        A/P: Patient is 45 year old female with no complaints. Here for well woman exam. Treatment options such as an endometrial ablation or low dose hormonal replacement therapy were provided to the patient. Pt is against the use or hormones and has an interest for the ablation. Pt is to schedule an appt for an .S and EDM bx for planning of an ablation. Pt agreed and had no further questions. Follow up with Dr Guzman if ablation is decided  -Kvng GILBERT           Patient counseled on:    Diet/exercise.      Self Breast Exams     Safe sex practices / and living environment     Vaccines:  Annual Flu, Tdap +/- Gardasil recommended (up to 45 yrs).      Pneumococcal at 65 yrs old, Shingles at 60 yrs old          Pap: neg/neg - Year:  2024 neg  GC/Chlamydia:   na  Mammogram:  2024 neg  Dexa:  na  Colonoscopy :  2024 f/u in 10 yrs  Lipid / Cholesterol:  2023       Cholesterol, Total  <200 mg/dL 193   Comment: Desirable  <200 mg/dL  Borderline  200-239 mg/dL  High      >=240 mg/dL     HDL Cholesterol  40 - 59 mg/dL 88 High    Comment: Interpretive Information:  An HDL cholesterol <40 mg/dL is low and constitutes a coronary heart disease risk factor. An HDL cholesterol >60 mg/dL is a negative risk factor for coronary heart disease.     Triglycerides  30 - 149 mg/dL 59   Comment: Reference interval for fasting triglycerides  Desirable: <150 mg/dL  Borderline: 150-199 mg/dL  High: 200-499 mg/dL  Very High: >=500 mg/dL       LDL Cholesterol  <100 mg/dL 94   Comment: Desirable <100 mg/dL  Borderline 100-129 mg/dL  High     >=130mg/dL     VLDL  0 - 30 mg/dL 10   Non HDL Chol  <130 mg/dL 105       Meds This Visit:    Requested Prescriptions      No prescriptions requested or ordered in this encounter       1. Visit for screening mammogram  - [facility] MAK 2D+3D SCREENING BILAT (CPT=77067/86921); Future    2. Atypical lobular hyperplasia (ALH) of breast - s/p lumpectomy 2021    3. Encounter for well woman exam with routine gynecological exam    4. Perimenopausal symptoms    5. Abnormal uterine bleeding (AUB)    Plan ultrasound and embx  Pt considering ablation but would like to complete embx first    Return in about 3 weeks (around 4/9/2025) for ultrasound, EMBX.    Katerina Clemente, TERRI   3/19/2025  1:53 PM       This note was created by [employer] voice recognition. Errors in content may be related to improper recognition by the system; efforts to review and correct have been done but errors may still exist. Please contact me with any questions.    Note to patient and family   The 21st Century Cures Act makes medical notes available to patients in the interest of transparency.  However, please be advised that this is a medical document.  It is intended as ttra-te-nqtv communication.  It  is written in medical language which may contain abbreviations or verbiage that are technical and unfamiliar.  It may appear blunt or direct.  Medical documents are intended to carry relevant information, facts as evident, and the clinical opinion of the practitioner.           [1]   Allergies  Allergen Reactions    Penicillin G HIVES   [2]   Current Outpatient Medications on File Prior to Visit   Medication Sig Dispense Refill    escitalopram 10 MG Oral Tab Take 1 tablet (10 mg total) by mouth every morning. 90 tablet 3    ALPRAZolam 0.25 MG Oral Tab Take 1 tablet (0.25 mg total) by mouth daily as needed for Anxiety. 30 tablet 0    Ergocalciferol (VITAMIN D OR) Take by mouth daily.      acyclovir 400 MG Oral Tab 1 tablet (400 mg total) as needed.  0    Multiple Vitamins-Minerals (MULTIPLE VITAMINS/WOMENS) Oral Tab Take 1 tablet by mouth daily.       No current facility-administered medications on file prior to visit.      present

## 2025-04-08 ENCOUNTER — MED REC SCAN ONLY (OUTPATIENT)
Facility: CLINIC | Age: 45
End: 2025-04-08

## 2025-04-08 ENCOUNTER — NURSE ONLY (OUTPATIENT)
Facility: CLINIC | Age: 45
End: 2025-04-08
Payer: COMMERCIAL

## 2025-04-08 VITALS — WEIGHT: 128 LBS | DIASTOLIC BLOOD PRESSURE: 62 MMHG | BODY MASS INDEX: 22 KG/M2 | SYSTOLIC BLOOD PRESSURE: 100 MMHG

## 2025-04-08 DIAGNOSIS — N93.9 ABNORMAL UTERINE BLEEDING (AUB): Primary | ICD-10-CM

## 2025-04-08 DIAGNOSIS — N92.0 MENORRHAGIA WITH REGULAR CYCLE: ICD-10-CM

## 2025-04-08 DIAGNOSIS — R93.89 THICKENED ENDOMETRIUM: ICD-10-CM

## 2025-04-08 LAB
CONTROL LINE PRESENT WITH A CLEAR BACKGROUND (YES/NO): YES YES/NO
KIT LOT #: NORMAL NUMERIC

## 2025-04-08 PROCEDURE — 88305 TISSUE EXAM BY PATHOLOGIST: CPT

## 2025-04-08 PROCEDURE — 58100 BIOPSY OF UTERUS LINING: CPT

## 2025-04-08 PROCEDURE — 76830 TRANSVAGINAL US NON-OB: CPT

## 2025-04-08 PROCEDURE — 3078F DIAST BP <80 MM HG: CPT

## 2025-04-08 PROCEDURE — 3074F SYST BP LT 130 MM HG: CPT

## 2025-04-08 PROCEDURE — 81025 URINE PREGNANCY TEST: CPT

## 2025-04-08 NOTE — PROCEDURES
Endometrial Biopsy     Pre-Procedure Care:   Consent was obtained.  Procedure/risks were explained.  Questions were answered.  Correct patient was identified.  Correct side and site were confirmed.    Pregnancy Results: negative from urine test   Birth control method(s) used:  vasectomy    Indication: menorrhagia, thickened endometrial strip on ultrasound    Pre-Medications:    The patient was premedicated with Ibuprofen .    Description of Procedure:   A speculum was placed in the vagina and the cervix was prepped with betadine solution.   Single tooth tenaculum placed at the 12 o'clock position.   The uterine cavity was sounded at 8 cm.   The endometrial cavity was curetted for pipelle tissue sampling with 3 passes.  Specimen was sent to pathology.   The single tooth tenaculum was removed.   Silver nitrate was applied at the site of tenaculum application   Good hemostasis was noted.  There were no complications.    There was no blood loss.      Discharge instructions were provided to the patient.    Visit Plan:  Ultrasound report was reviewed with the patient.  Await endometrial biopsy results  Patient planning on ablation - to schedule appointment with Dr. Douglas to discuss ablation      TERRI Benitez  4/8/2025  2:43 PM

## 2025-04-08 NOTE — PATIENT INSTRUCTIONS
Endometrial Biopsy Instructions    Ultrasound report was reviewed with the patient.  Await final pathology prior to treatment.  Considering ablation

## 2025-04-24 ENCOUNTER — APPOINTMENT (OUTPATIENT)
Dept: DERMATOLOGY | Age: 45
End: 2025-04-24

## 2025-04-24 DIAGNOSIS — L81.2 FRECKLES: Primary | ICD-10-CM

## 2025-04-24 PROCEDURE — 99213 OFFICE O/P EST LOW 20 MIN: CPT | Performed by: DERMATOLOGY

## 2025-05-05 ENCOUNTER — OFFICE VISIT (OUTPATIENT)
Facility: CLINIC | Age: 45
End: 2025-05-05
Payer: COMMERCIAL

## 2025-05-05 VITALS
WEIGHT: 128.63 LBS | SYSTOLIC BLOOD PRESSURE: 96 MMHG | DIASTOLIC BLOOD PRESSURE: 68 MMHG | BODY MASS INDEX: 21.96 KG/M2 | HEIGHT: 64 IN

## 2025-05-05 DIAGNOSIS — N92.0 MENORRHAGIA WITH REGULAR CYCLE: ICD-10-CM

## 2025-05-05 DIAGNOSIS — N60.91 ATYPICAL LOBULAR HYPERPLASIA (ALH) OF RIGHT BREAST: Primary | ICD-10-CM

## 2025-05-05 DIAGNOSIS — R93.89 THICKENED ENDOMETRIUM: ICD-10-CM

## 2025-05-05 PROBLEM — K51.20 ULCERATIVE PROCTITIS WITHOUT COMPLICATION (HCC): Status: ACTIVE | Noted: 2025-05-05

## 2025-05-05 NOTE — PROGRESS NOTES
Gynecology Office Visit      Cheryl Garcia is a 45 year old female  Patient's last menstrual period was 2025 (approximate). (contraception:  vasectomy)     HPI:     Chief Complaint   Patient presents with    Follow - Up     Here to follow up on embx results 25: benign and discuss removal of polyp as well as ablation     Heavy periods - see work up by NP - 3/19 and 25        Chart and previous encounters reviewed.  3/19/2025  1:53 PM          Chief Complaint   Patient presents with    Physical       Pt reports longer periods 2 weeks on period 3 weeks off, anxiety and acne increased as well as night sweats x 1 year         HPI: Cheryl is a 45 year old  Patient's last menstrual period was 2025 (approximate).  (contraception: vasectomy) here for her annual gyn exam.      Pt reports for the last 12 months she has been experiencing 14 days of menstrual bleeding. She will experience 7 days of bleeding, have one day without bleeding , and then start bleeding again for 7 days. She then has a 3 week window of when she does not experience the periods. With the bleeding she has experienced anxiety and acne which she takes Lexapro for as prescribed by her PCP, but wants to increase dose for efficacy. OTC measures for her acne which helped. Has night sweats during the days of her period. Cramps have been consistent with past baseline periods. Denies any pelvic or breast complaints.  Satisfied with current contraception. Not interested in any hormonal therapy, considering ablation.  -Kvng GILBERT      HISTORY:  Past Medical History[1]   Past Surgical History[2]   Family History[3]   Social History: Short Social Hx on File[4]     Medications (Active prior to today's visit):  Current Medications[5]    Allergies:  Allergies[6]    Gyn:  Menarche: 11  Period Cycle (Days): \"back to back' per patient  Period Duration (Days): 14 days  Period Flow: heavy day 2-4  Use of Birth Control  (if yes, specify type): Vasectomy  Pap Date: 21  Pap Result Notes: 24 neg/neg; 9/3/21 Neg/Neg     (3/11/2017 neg,neg  (2013 neg,neg  10/2012 neg  2012 neg)  Follow Up Recommendation: due     OB Hx:  OB History    Para Term  AB Living   4 3 3  1 3   SAB IAB Ectopic Multiple Live Births   1   0 3      # Outcome Date GA Lbr Yao/2nd Weight Sex Type Anes PTL Lv   4 Term 11/25/15 39w1d 01::27 7 lb 8.3 oz (3.41 kg) M NORMAL SPONT EPI N STACIA      Complications: Variable decelerations, Late decelerations, Other - see comments   3 Term 12 40w0d   M NORMAL SPONT EPI  STACIA   2 Term 06/20/10 41w0d   F    STACIA   1 SAB 09 4w0d                ROS:     10 point ROS completed and was negative, except for pertinent positive and negatives stated in the HPI.    PHYSICAL EXAM:   BP 96/68   Ht 64\"   Wt 128 lb 9.6 oz (58.3 kg)   LMP 2025 (Approximate)   BMI 22.07 kg/m²      Wt Readings from Last 6 Encounters:   25 128 lb 9.6 oz (58.3 kg)   25 128 lb (58.1 kg)   25 124 lb (56.2 kg)   24 123 lb (55.8 kg)   24 123 lb (55.8 kg)   23 124 lb (56.2 kg)        Gen:  Oriented, in no acute distress  Abdomen: no scars, scaphoid, benign without peritoneal signs, rebound tenderness,   guarding, or masses       Pelvic ultrasound done for abnormal uterine bleeding done on 2025.  Imaging done transvaginally only.     Findings: Anteverted anteflexed uterus measuring 10.1 x 8.0 x 5.9 cm with a 15.3 mm endometrial stripe.  Right ovary measures 4.2 x 1.8 x 3.1 cm and is normal the left ovary measures 3.8 x 1.7 x 2.3 cm.  There is a 17 mm hemorrhagic corpus luteum in the left ovary.  A subserosal myomas noted on the right measuring 3.3 x 3.2 x 3.2 cm.  No free fluid in the cul-de-sac.     Impression: Small fibroid uterus with thickened endometrial stripe.  Uterine sampling is necessary.       25      Final Diagnosis:   Biopsy, endometrium:  --Fragments  of proliferative endometrium, some of which show features suggestive of benign endometrial polyp.   -No evidence of hyperplasia or malignancy.          Detailed conversation about treatment options - observe, Lysteda, OCP's, H-IUD, Ablation and hysterectomy (discouraged).    Explained the need to labs and for follow up with breast surgeon.       ASSESSMENT/PLAN:     1. Atypical lobular hyperplasia (ALH) of right breast  - Surg Onc Breast Referral - In Network    2. Thickened endometrium    3. Menorrhagia with regular cycle  - CBC, Platelet; No Differential; Future  - TSH W Reflex To Free T4; Future  - FSH; Future    PA for HS, D&C , Novasure EA  Needs follow up with breast surgeon for ALH  Referred to [place]      Meds This Visit:  Requested Prescriptions      No prescriptions requested or ordered in this encounter       Imaging & Referrals:  OP REFERRAL TO SURGICAL ONCOLOGY     Return in about 6 weeks (around 6/16/2025) for Post. Op..    I have Spent 35 min total time on the day of the encounter, including:      Preparing to see patient  Obtaining and /or reviewing separately obtained history  Performing a medically appropriate examination and evaluation  Counseling and educating the patient    Preauthorizing procedures   Referring to other providers   Documenting in the electronic record  Independently interpreting results and communication those results to the patient    Coordinating care with others.   Kahlil Douglas MD  5/5/2025  1:04 PM         This note was created by [employer] voice recognition. Errors in content may be related to improper recognition by the system; efforts to review and correct have been done but errors may still exist. Please contact me with any questions.    Note to patient and family   The 21st Century Cures Act makes medical notes available to patients in the interest of transparency.  However, please be advised that this is a medical document.  It is intended as kvqv-ks-jyna  communication.  It is written and medical language may contain abbreviations or verbiage that are technical and unfamiliar.  It may appear blunt or direct.  Medical documents are intended to carry relevant information, facts as evident, and the clinical opinion of the practitioner.           [1]   Past Medical History:   Amenorrhea    Anxiety state, unspecified    Herpes simplex without mention of complication    Proctitis    Visual impairment    contacts   [2]   Past Surgical History:  Procedure Laterality Date    Colonoscopy  Unsure    Colonoscopy with biopsy  2013    Olivia localization wire 1 site right (cpt=19281) Right 2021    Needle biopsy right Right 2021    2 site bx, fibroadenoma, ALH      11.25.15    Other surgical history  2021    wire loc and excision 2 R breast masses   [3]   Family History  Problem Relation Age of Onset    Cancer Mother         Skin    Other (Aneurysm of unspecified site) Mother     Dementia Mother     Cancer Father         Prostate    Other (CABG) Father     Other (Tuberculosis) Father     Cancer Brother    [4]   Social History  Socioeconomic History    Marital status:    Occupational History    Occupation: Collateral reviewer   Tobacco Use    Smoking status: Former     Current packs/day: 0.00     Types: Cigarettes     Quit date: 2008     Years since quittin.2    Smokeless tobacco: Never   Vaping Use    Vaping status: Never Used   Substance and Sexual Activity    Alcohol use: Yes     Alcohol/week: 5.0 standard drinks of alcohol     Types: 5 Standard drinks or equivalent per week    Drug use: No    Sexual activity: Yes     Partners: Male     Birth control/protection: Vasectomy   Other Topics Concern    Blood Transfusions No    Caffeine Concern Yes     Comment:  2 cups daily    Stress Concern Yes    Exercise Yes     Comment: 2 times/week power walk    Seat Belt Yes    Self-Exams No     Social Drivers of Health     Food Insecurity: No Food  Insecurity (3/19/2025)    NCSS - Food Insecurity     Worried About Running Out of Food in the Last Year: No     Ran Out of Food in the Last Year: No   Transportation Needs: No Transportation Needs (3/19/2025)    NCSS - Transportation     Lack of Transportation: No   Housing Stability: Not At Risk (3/19/2025)    NCSS - Housing/Utilities     Has Housing: Yes     Worried About Losing Housing: No     Unable to Get Utilities: No   [5]   Current Outpatient Medications   Medication Sig Dispense Refill    escitalopram 10 MG Oral Tab Take 1 tablet (10 mg total) by mouth every morning. 90 tablet 3    ALPRAZolam 0.25 MG Oral Tab Take 1 tablet (0.25 mg total) by mouth daily as needed for Anxiety. 30 tablet 0    Ergocalciferol (VITAMIN D OR) Take by mouth daily.      acyclovir 400 MG Oral Tab 1 tablet (400 mg total) as needed.  0   [6]   Allergies  Allergen Reactions    Penicillin G HIVES

## 2025-05-09 ENCOUNTER — LAB ENCOUNTER (OUTPATIENT)
Dept: LAB | Age: 45
End: 2025-05-09
Attending: OBSTETRICS & GYNECOLOGY
Payer: COMMERCIAL

## 2025-05-09 DIAGNOSIS — N92.0 MENORRHAGIA WITH REGULAR CYCLE: ICD-10-CM

## 2025-05-09 LAB
ERYTHROCYTE [DISTWIDTH] IN BLOOD BY AUTOMATED COUNT: 13.6 %
FSH SERPL-ACNC: 27.6 MIU/ML
HCT VFR BLD AUTO: 34.9 % (ref 35–48)
HGB BLD-MCNC: 11.1 G/DL (ref 12–16)
MCH RBC QN AUTO: 28.8 PG (ref 26–34)
MCHC RBC AUTO-ENTMCNC: 31.8 G/DL (ref 31–37)
MCV RBC AUTO: 90.6 FL (ref 80–100)
PLATELET # BLD AUTO: 252 10(3)UL (ref 150–450)
RBC # BLD AUTO: 3.85 X10(6)UL (ref 3.8–5.3)
TSI SER-ACNC: 2.27 UIU/ML (ref 0.55–4.78)
WBC # BLD AUTO: 6.6 X10(3) UL (ref 4–11)

## 2025-05-09 PROCEDURE — 36415 COLL VENOUS BLD VENIPUNCTURE: CPT

## 2025-05-09 PROCEDURE — 84443 ASSAY THYROID STIM HORMONE: CPT

## 2025-05-09 PROCEDURE — 85027 COMPLETE CBC AUTOMATED: CPT

## 2025-05-09 PROCEDURE — 83001 ASSAY OF GONADOTROPIN (FSH): CPT

## 2025-05-13 ENCOUNTER — ANESTHESIA EVENT (OUTPATIENT)
Dept: SURGERY | Facility: HOSPITAL | Age: 45
End: 2025-05-13
Payer: COMMERCIAL

## 2025-05-14 ENCOUNTER — ANESTHESIA (OUTPATIENT)
Dept: SURGERY | Facility: HOSPITAL | Age: 45
End: 2025-05-14
Payer: COMMERCIAL

## 2025-05-14 ENCOUNTER — HOSPITAL ENCOUNTER (OUTPATIENT)
Facility: HOSPITAL | Age: 45
Setting detail: HOSPITAL OUTPATIENT SURGERY
Discharge: HOME | End: 2025-05-14
Attending: OBSTETRICS & GYNECOLOGY | Admitting: OBSTETRICS & GYNECOLOGY
Payer: COMMERCIAL

## 2025-05-14 VITALS
SYSTOLIC BLOOD PRESSURE: 102 MMHG | RESPIRATION RATE: 16 BRPM | WEIGHT: 127 LBS | HEART RATE: 55 BPM | OXYGEN SATURATION: 99 % | HEIGHT: 64 IN | BODY MASS INDEX: 21.68 KG/M2 | TEMPERATURE: 97 F | DIASTOLIC BLOOD PRESSURE: 41 MMHG

## 2025-05-14 DIAGNOSIS — G89.18 POST-OP PAIN: Primary | ICD-10-CM

## 2025-05-14 PROBLEM — N92.0 MENORRHAGIA WITH REGULAR CYCLE: Status: ACTIVE | Noted: 2025-05-14

## 2025-05-14 PROBLEM — Z98.890 S/P ENDOMETRIAL ABLATION: Status: ACTIVE | Noted: 2025-05-14

## 2025-05-14 PROBLEM — N92.0 MENORRHAGIA WITH REGULAR CYCLE: Status: RESOLVED | Noted: 2025-05-14 | Resolved: 2025-05-14

## 2025-05-14 PROBLEM — N84.0 ENDOMETRIAL POLYP: Status: RESOLVED | Noted: 2025-05-14 | Resolved: 2025-05-14

## 2025-05-14 PROBLEM — N84.0 ENDOMETRIAL POLYP: Status: ACTIVE | Noted: 2025-05-14

## 2025-05-14 LAB — B-HCG UR QL: NEGATIVE

## 2025-05-14 PROCEDURE — 58563 HYSTEROSCOPY ABLATION: CPT | Performed by: OBSTETRICS & GYNECOLOGY

## 2025-05-14 RX ORDER — MIDAZOLAM HYDROCHLORIDE 1 MG/ML
INJECTION INTRAMUSCULAR; INTRAVENOUS AS NEEDED
Status: DISCONTINUED | OUTPATIENT
Start: 2025-05-14 | End: 2025-05-14 | Stop reason: SURG

## 2025-05-14 RX ORDER — PROCHLORPERAZINE EDISYLATE 5 MG/ML
5 INJECTION INTRAMUSCULAR; INTRAVENOUS EVERY 8 HOURS PRN
Status: DISCONTINUED | OUTPATIENT
Start: 2025-05-14 | End: 2025-05-14

## 2025-05-14 RX ORDER — SODIUM CHLORIDE, SODIUM LACTATE, POTASSIUM CHLORIDE, CALCIUM CHLORIDE 600; 310; 30; 20 MG/100ML; MG/100ML; MG/100ML; MG/100ML
INJECTION, SOLUTION INTRAVENOUS CONTINUOUS
Status: DISCONTINUED | OUTPATIENT
Start: 2025-05-14 | End: 2025-05-14

## 2025-05-14 RX ORDER — SCOPOLAMINE 1 MG/3D
1 PATCH, EXTENDED RELEASE TRANSDERMAL ONCE
Status: DISCONTINUED | OUTPATIENT
Start: 2025-05-14 | End: 2025-05-14 | Stop reason: HOSPADM

## 2025-05-14 RX ORDER — HYDROMORPHONE HYDROCHLORIDE 1 MG/ML
0.2 INJECTION, SOLUTION INTRAMUSCULAR; INTRAVENOUS; SUBCUTANEOUS EVERY 5 MIN PRN
Status: DISCONTINUED | OUTPATIENT
Start: 2025-05-14 | End: 2025-05-14

## 2025-05-14 RX ORDER — HYDROCODONE BITARTRATE AND ACETAMINOPHEN 5; 325 MG/1; MG/1
1-2 TABLET ORAL EVERY 4 HOURS PRN
Qty: 6 TABLET | Refills: 0 | Status: SHIPPED | OUTPATIENT
Start: 2025-05-14

## 2025-05-14 RX ORDER — ACETAMINOPHEN 500 MG
1000 TABLET ORAL ONCE
Status: DISCONTINUED | OUTPATIENT
Start: 2025-05-14 | End: 2025-05-14 | Stop reason: HOSPADM

## 2025-05-14 RX ORDER — DEXAMETHASONE SODIUM PHOSPHATE 4 MG/ML
VIAL (ML) INJECTION AS NEEDED
Status: DISCONTINUED | OUTPATIENT
Start: 2025-05-14 | End: 2025-05-14 | Stop reason: SURG

## 2025-05-14 RX ORDER — MEPERIDINE HYDROCHLORIDE 25 MG/ML
12.5 INJECTION INTRAMUSCULAR; INTRAVENOUS; SUBCUTANEOUS AS NEEDED
Status: DISCONTINUED | OUTPATIENT
Start: 2025-05-14 | End: 2025-05-14

## 2025-05-14 RX ORDER — ONDANSETRON 2 MG/ML
INJECTION INTRAMUSCULAR; INTRAVENOUS AS NEEDED
Status: DISCONTINUED | OUTPATIENT
Start: 2025-05-14 | End: 2025-05-14 | Stop reason: SURG

## 2025-05-14 RX ORDER — NALOXONE HYDROCHLORIDE 0.4 MG/ML
80 INJECTION, SOLUTION INTRAMUSCULAR; INTRAVENOUS; SUBCUTANEOUS AS NEEDED
Status: DISCONTINUED | OUTPATIENT
Start: 2025-05-14 | End: 2025-05-14

## 2025-05-14 RX ORDER — ROPIVACAINE HYDROCHLORIDE 5 MG/ML
INJECTION, SOLUTION EPIDURAL; INFILTRATION; PERINEURAL AS NEEDED
Status: DISCONTINUED | OUTPATIENT
Start: 2025-05-14 | End: 2025-05-14 | Stop reason: HOSPADM

## 2025-05-14 RX ORDER — ACETAMINOPHEN 500 MG
1000 TABLET ORAL ONCE AS NEEDED
Status: COMPLETED | OUTPATIENT
Start: 2025-05-14 | End: 2025-05-14

## 2025-05-14 RX ORDER — HYDROCODONE BITARTRATE AND ACETAMINOPHEN 10; 325 MG/1; MG/1
1 TABLET ORAL ONCE AS NEEDED
Status: COMPLETED | OUTPATIENT
Start: 2025-05-14 | End: 2025-05-14

## 2025-05-14 RX ORDER — HYDROMORPHONE HYDROCHLORIDE 1 MG/ML
0.4 INJECTION, SOLUTION INTRAMUSCULAR; INTRAVENOUS; SUBCUTANEOUS EVERY 5 MIN PRN
Status: DISCONTINUED | OUTPATIENT
Start: 2025-05-14 | End: 2025-05-14

## 2025-05-14 RX ORDER — LABETALOL HYDROCHLORIDE 5 MG/ML
5 INJECTION, SOLUTION INTRAVENOUS EVERY 5 MIN PRN
Status: DISCONTINUED | OUTPATIENT
Start: 2025-05-14 | End: 2025-05-14

## 2025-05-14 RX ORDER — HYDROCODONE BITARTRATE AND ACETAMINOPHEN 10; 325 MG/1; MG/1
2 TABLET ORAL ONCE AS NEEDED
Status: COMPLETED | OUTPATIENT
Start: 2025-05-14 | End: 2025-05-14

## 2025-05-14 RX ORDER — ONDANSETRON 2 MG/ML
4 INJECTION INTRAMUSCULAR; INTRAVENOUS EVERY 6 HOURS PRN
Status: DISCONTINUED | OUTPATIENT
Start: 2025-05-14 | End: 2025-05-14

## 2025-05-14 RX ORDER — HYDROMORPHONE HYDROCHLORIDE 1 MG/ML
0.6 INJECTION, SOLUTION INTRAMUSCULAR; INTRAVENOUS; SUBCUTANEOUS EVERY 5 MIN PRN
Status: DISCONTINUED | OUTPATIENT
Start: 2025-05-14 | End: 2025-05-14

## 2025-05-14 RX ORDER — VASOPRESSIN 20 [USP'U]/ML
INJECTION, SOLUTION INTRAVENOUS AS NEEDED
Status: DISCONTINUED | OUTPATIENT
Start: 2025-05-14 | End: 2025-05-14 | Stop reason: HOSPADM

## 2025-05-14 RX ORDER — MIDAZOLAM HYDROCHLORIDE 1 MG/ML
1 INJECTION INTRAMUSCULAR; INTRAVENOUS EVERY 5 MIN PRN
Status: DISCONTINUED | OUTPATIENT
Start: 2025-05-14 | End: 2025-05-14

## 2025-05-14 RX ADMIN — SODIUM CHLORIDE, SODIUM LACTATE, POTASSIUM CHLORIDE, CALCIUM CHLORIDE: 600; 310; 30; 20 INJECTION, SOLUTION INTRAVENOUS at 11:20:00

## 2025-05-14 RX ADMIN — MIDAZOLAM HYDROCHLORIDE 2 MG: 1 INJECTION INTRAMUSCULAR; INTRAVENOUS at 10:35:00

## 2025-05-14 RX ADMIN — DEXAMETHASONE SODIUM PHOSPHATE 4 MG: 4 MG/ML VIAL (ML) INJECTION at 10:35:00

## 2025-05-14 RX ADMIN — ONDANSETRON 4 MG: 2 INJECTION INTRAMUSCULAR; INTRAVENOUS at 10:35:00

## 2025-05-14 NOTE — OPERATIVE REPORT
Operative Report        Preoperative Diagnosis:  Endometrial polyps         Menorrhagia        Postoperative Diagnosis: Same    Operation:  Diagnostic Hysteroscopy          Dilatation and Curettage          Novasure Endometrial Ablation    Surgeon:  Stella  1A: GURWINDER Bonilla    Anesthesia:  MAC, local    EBL: 10 cc's    Surgical findings: 6 week size uterus. Axial with RF of fundus.   Redundant endometrium with small polypoid like changes of the lining.  Both tubal ostia visualized.      The cervix sounded to 3 cm's,  Uterus sounded to 8.5 cm's after hysteroscopy for calculated cavity length of 5.5 cm's,  the width was 3.4 cm's.  We burned at 122 esquivel for 65 seconds.    Procedure: The patient was prepped and draped in the usual sterile fashion after satisfactory anesthesia was obtained. The bladder was catheterized yielding 200 cc of clear urine. The patient was examined with the findings as noted above. A weighted speculum was placed in the vagina and the anterior lip of the cervix was grasped with a single tooth tenaculum and brought forward. Local anesthetic was instilled in the paracervical region.  The cervical length was measured up to the internal os. With continuous saline infusion, the cervix was hydrodilated as a 5 mm hysteroscope was inserted. Hysteroscopy was performed with continuous saline infusion on a 150 mmHg pressure bag with the findings as noted above. The total scope time was less than two minutes and the fluid deficits were minimal. The hysteroscope was removed.  The uterus was measured / sounded and the calculated cavity length was entered into the controller.   The cervix was further dilated up to 7 mm's.  Sharp curettage was performed yielding polypoid endometrial tissue.  This was sent to pathology.    This Novasure device was seated into the uterine cavity and the width was determined and entered into the controller.  We then performed the cavity C02  assessment and it passed.  The  controller was then enabled the treatment performed.  Following a full treatment cycle, the array was collapsed and handpiece removed intact. Tenaculum marks were hemostatic. Fluids were removed from the vagina.  Patient was awakened without difficulty and taken to the recovery room in good condition. All sponge and instrument counts were reported to me as being correct.    Specimen: Endometrial Curettings with tiny polyps    Complications: None    Kahlil Douglas MD  5/14/2025

## 2025-05-14 NOTE — ANESTHESIA PREPROCEDURE EVALUATION
PRE-OP EVALUATION    Patient Name: Cherly Garcia    Admit Diagnosis: MENORRHAGIA, FIBROID UTERUS    Pre-op Diagnosis: MENORRHAGIA, FIBROID UTERUS    HYSTEROSCOPY DILATION AND CURETTAGE , NOVASURE ENDOMETRIAL ABLATION    Anesthesia Procedure: HYSTEROSCOPY DILATION AND CURETTAGE , NOVASURE ENDOMETRIAL ABLATION    Surgeons and Role:     * Kahlil Douglas MD - Primary    Pre-op vitals reviewed.        Body mass index is 21.97 kg/m².    Current medications reviewed.  Hospital Medications:  Current Medications[1]    Outpatient Medications:   Prescriptions Prior to Admission[2]    Allergies: Penicillin g      Anesthesia Evaluation    Patient summary reviewed.    Anesthetic Complications  (-) history of anesthetic complications         GI/Hepatic/Renal                                 Cardiovascular                                                       Endo/Other               (+) anemia                   Pulmonary                           Neuro/Psych        (+) anxiety                      Anxiety Atypical lobular hyperplasia (ALH) of breast - right  Bloody discharge from right nipple Ectopy of cervix  Other acne Ulcerative proctitis without complication (HCC)  Vitamin D deficiency             Past Surgical History[3]  Social Hx on file[4]  History   Drug Use No     Available pre-op labs reviewed.  Lab Results   Component Value Date    WBC 6.6 05/09/2025    RBC 3.85 05/09/2025    HGB 11.1 (L) 05/09/2025    HCT 34.9 (L) 05/09/2025    MCV 90.6 05/09/2025    MCH 28.8 05/09/2025    MCHC 31.8 05/09/2025    RDW 13.6 05/09/2025    .0 05/09/2025               Airway      Mallampati: I  Mouth opening: >3 FB  TM distance: > 6 cm  Neck ROM: full Cardiovascular             Dental    Dentition appears grossly intact         Pulmonary    Pulmonary exam normal.                 Other findings              ASA: 1   Plan: MAC  NPO status verified and           Plan/risks discussed with: patient                Present on  Admission:  **None**             [1]   No current facility-administered medications on file as of .   [2]   No medications prior to admission.   [3]   Past Surgical History:  Procedure Laterality Date    Colonoscopy  Unsure    Colonoscopy with biopsy  2013    Olivia localization wire 1 site right (cpt=19281) Right 2021    Needle biopsy right Right 2021    2 site bx, fibroadenoma, ALH      11.25.15    Other surgical history  2021    wire loc and excision 2 R breast masses   [4]   Social History  Socioeconomic History    Marital status:    Occupational History    Occupation: Collateral reviewer   Tobacco Use    Smoking status: Former     Current packs/day: 0.00     Types: Cigarettes     Quit date: 2008     Years since quittin.2    Smokeless tobacco: Never   Vaping Use    Vaping status: Never Used   Substance and Sexual Activity    Alcohol use: Yes     Alcohol/week: 5.0 standard drinks of alcohol     Types: 5 Standard drinks or equivalent per week     Comment: OCC.    Drug use: No    Sexual activity: Yes     Partners: Male     Birth control/protection: Vasectomy   Other Topics Concern    Blood Transfusions No    Caffeine Concern Yes     Comment:  2 cups daily    Stress Concern Yes    Exercise Yes     Comment: 2 times/week power walk    Seat Belt Yes    Self-Exams No

## 2025-05-14 NOTE — DISCHARGE INSTRUCTIONS
Home Care Instructions  Dilatation and Curettage (D&C)    Surgeon:  Kahlil Douglas MD    Dilation of the cervix (the opening to the uterus) and scraping of the endometrial lining of the uterus is used for diagnostic and/or therapeutic purposes.    Alternate Motrin 600 mg's with Tylenol 1,000 mg's every 3 hours.  Norco for severe pain only.   May take next dose of Tylenol at 6:00pm. Do not take Tylenol with Norco.    IMPORTANT POINTS TO KNOW    You may experience a scratchy throat from the breathing tube used during general anesthesia.  You may resume your regular diet as tolerated.  Minimal activity is preferred for 2-3 days.  It is important to wipe from front to back after elimination.  No sexual intercourse, douching or use of tampons for 2 weeks.  Vaginal bleeding or spotting may continue for a week after the procedure.  Mild cramping may continue for 2-3 days after the procedure.  It is alright to take extra strength acetaminophen (Tylenol) or ibuprofen (Motrin/Advil) to help relieve the cramping.  Return to the clinic for your follow up appointment as instructed by physician.      NOTIFY THE OFFICE IF:    Vaginal bleeding that is heavier than a menstrual period.  Vaginal discharge that burns, irritated, or has a foul odor.  Chill or fever over 101 degrees F.  You develop severe lower abdominal cramps or pain.  You develop frequency, urgency and/or burning with urination.      NOTE:  The above listed information is meant to be a guide only.  If you have any problems or questions not answered on the sheet, please call the office.

## 2025-05-14 NOTE — H&P
[facility]  History & Physical    Cheryl Garcia Patient Status:  Hospital Outpatient Surgery    1980 MRN [id number]   Location [facility] PERIOPERATIVE SERVICE Attending Kahlil Douglas MD   Hosp Day # 0 PCP Noemi Nguyen MD     SUBJECTIVE:    Reason for Admission:  Endometrial Polyps  Menorrhagia    History of Present Illness:  Patient is a(n) 45 year old female [id number]'s last menstrual period was 2025 (approximate). (contraception: vasectomy)  here for removal of polyps and endometrial ablation.  See notes from 25 in epic along with  and  for clinical history.     Medications:  Prescriptions Prior to Admission[1]    Allergies:  Allergies[2]     Past Medical History:  Past Medical History[3]    Past Surgical History:  Past Surgical History[4]    Past OB History:  OB History    Para Term  AB Living   4 3 3  1 3   SAB IAB Ectopic Multiple Live Births   1   0 3      # Outcome Date GA Lbr Yao/2nd Weight Sex Type Anes PTL Lv   4 Term 11/25/15 39w1d 01:28  00:27 7 lb 8.3 oz (3.41 kg) M NORMAL SPONT EPI N STACIA      Complications: Variable decelerations, Late decelerations, Other - see comments   3 Term 12 40w0d   M NORMAL SPONT EPI  STACIA   2 Term 06/20/10 41w0d   F    STACIA   1 SAB 09 4w0d              Past GYN History:   Menarche: 11 (2025  1:05 PM)  Period Cycle (Days): \"back to back' per patient (2025  1:05 PM)  Period Duration (Days): 14 days (2025  1:05 PM)  Period Flow: heavy day 2-4 (2025  1:05 PM)  Use of Birth Control (if yes, specify type): Vasectomy (2025  1:05 PM)  Pap Date: 21 (2025  1:05 PM)  Pap Result Notes: 24 neg/neg; 9/3/21 Neg/Neg     (3/11/2017 neg,neg  (2013 neg,neg  10/2012 neg  2012 neg) (2025  1:05 PM)  Follow Up Recommendation: due  (2025  1:05 PM)          Social History:  Social History     Tobacco Use    Smoking status: Former     Current packs/day:  0.00     Types: Cigarettes     Quit date: 2008     Years since quittin.2    Smokeless tobacco: Never   Substance Use Topics    Alcohol use: Yes     Alcohol/week: 5.0 standard drinks of alcohol     Types: 5 Standard drinks or equivalent per week     Comment: OCC.        Family History:  Family History[5]    Review of Systems:  Non-contributory    OBJECTIVE:    Blood pressure 113/90, pulse 75, temperature 97.7 °F (36.5 °C), temperature source Oral, resp. rate 16, height 64\", weight 127 lb (57.6 kg), last menstrual period 2025, SpO2 100%, not currently breastfeeding.  No intake or output data in the 24 hours ending 25 0929    Physical Exam:  General: Alert, orientated x3. .  Vital Signs:  Blood pressure 113/90, pulse 75, temperature 97.7 °F (36.5 °C), temperature source Oral, resp. rate 16, height 64\", weight 127 lb (57.6 kg), last menstrual period 2025, SpO2 100%, not currently breastfeeding.. VSS Afebrile  HEENT: Exam is unremarkable.  Without scleral icterus.  Mucous membranes are moist. Pupils are equal and round, reactive to light and accommodate.  Oropharynx is clear.  Neck: No tenderness to palpitation.  Full range of motion to flexion and extension, lateral rotation and lateral flexion of cervical spine.  No JVD. Supple.   Lungs: Clear to auscultation bilaterally.  Cardiac: Regular rate and rhythm. No murmur.  Abdomen:  Soft, non-distended, non-tender. Benign without masses or peritoneal signs.   Pelvic:cervix normal in appearance, external genitalia normal, no adnexal masses or tenderness, no cervical motion tenderness, uterus normal size, shape, and consistency, and vagina normal without discharge  Extremities:  No lower extremity edema noted.  Without clubbing or cyanosis.    Skin: Normal texture         Diagnostics:       Pelvic ultrasound done for abnormal uterine bleeding done on 2025.  Imaging done transvaginally only.     Findings: Anteverted anteflexed uterus  measuring 10.1 x 8.0 x 5.9 cm with a 15.3 mm endometrial stripe.  Right ovary measures 4.2 x 1.8 x 3.1 cm and is normal the left ovary measures 3.8 x 1.7 x 2.3 cm.  There is a 17 mm hemorrhagic corpus luteum in the left ovary.  A subserosal myomas noted on the right measuring 3.3 x 3.2 x 3.2 cm.  No free fluid in the cul-de-sac.     Impression: Small fibroid uterus with thickened endometrial stripe.  Uterine sampling is necessary.     Data Review:      Recent Labs   Lab 05/09/25  1204   RBC 3.85   HGB 11.1*   HCT 34.9*   MCV 90.6   MCH 28.8   MCHC 31.8   RDW 13.6   WBC 6.6   .0           4/8/25    Final Diagnosis:   Biopsy, endometrium:  --Fragments of proliferative endometrium, some of which show features suggestive of benign endometrial polyp.   -No evidence of hyperplasia or malignancy.             ASSESSMENT:  Endometrial polyps  Menorrhagia  Problem List[6]        PLAN:    HS, D&C, polypectomies, Novasure Endometrial Ablation    All of the findings and plan were discussed with the patient.  She notes understanding and agrees with the plan of care. She understands the risks and complications of the procedure; including but not limited to bleeding, infection, trauma to GI and  tracts.   All questions were answered.    Kahlil Douglas MD  5/14/2025  9:29 AM        [1]   Medications Prior to Admission   Medication Sig Dispense Refill Last Dose/Taking    escitalopram 10 MG Oral Tab Take 1 tablet (10 mg total) by mouth every morning. 90 tablet 3 5/13/2025    Ergocalciferol (VITAMIN D OR) Take by mouth in the morning.   Past Month    ALPRAZolam 0.25 MG Oral Tab Take 1 tablet (0.25 mg total) by mouth daily as needed for Anxiety. 30 tablet 0 More than a month    acyclovir 400 MG Oral Tab 1 tablet (400 mg total) as needed.  0 More than a month   [2]   Allergies  Allergen Reactions    Penicillin G HIVES   [3]   Past Medical History:   Amenorrhea    Anxiety state, unspecified    Herpes simplex without mention  of complication    Proctitis    Visual impairment    contacts   [4]   Past Surgical History:  Procedure Laterality Date    Colonoscopy  Unsure    Colonoscopy with biopsy  2013    Olivia localization wire 1 site right (cpt=19281) Right 2021    Needle biopsy right Right 2021    2 site bx, fibroadenoma, ALH      11.25.15    Other surgical history  2021    wire loc and excision 2 R breast masses   [5]   Family History  Problem Relation Age of Onset    Cancer Mother         Skin    Other (Aneurysm of unspecified site) Mother     Dementia Mother     Cancer Father         Prostate    Other (CABG) Father     Other (Tuberculosis) Father     Cancer Brother    [6]   Patient Active Problem List  Diagnosis    Vitamin D deficiency    Other acne    Anxiety    Bloody discharge from right nipple    Ectopy of cervix    Atypical lobular hyperplasia (ALH) of breast - right    Ulcerative proctitis without complication (HCC)

## 2025-05-14 NOTE — DISCHARGE SUMMARY
[facility]  Discharge Summary    Cheryl Garcia Patient Status:  Hospital Outpatient Surgery    1980 MRN [id number]   Location [facility] PERIOPERATIVE SERVICE Attending Kahlil Douglas MD   Hosp Day # 0 PCP Noemi Nguyen MD     Date of Admission: 2025    Date of Discharge: 2025    Admitting Diagnosis: MENORRHAGIA, FIBROID UTERUS    Discharge Diagnosis: Problem List[1]    Reason for Admission: See History and Physical    Hospital Course: uncomplicated    Consultations: none    Procedures: Hysteroscopy, D&C, Endometrial Ablation    Complications: none    Disposition: Home or Self Care    Discharge Condition: Good    Discharge Medications:   Current Discharge Medication List        CONTINUE these medications which have NOT CHANGED    Details   escitalopram 10 MG Oral Tab Take 1 tablet (10 mg total) by mouth every morning.  Qty: 90 tablet, Refills: 3    Associated Diagnoses: Anxiety      Ergocalciferol (VITAMIN D OR) Take by mouth in the morning.      ALPRAZolam 0.25 MG Oral Tab Take 1 tablet (0.25 mg total) by mouth daily as needed for Anxiety.  Qty: 30 tablet, Refills: 0    Associated Diagnoses: Anxious mood      acyclovir 400 MG Oral Tab 1 tablet (400 mg total) as needed.  Refills: 0             Diet:  General    Activity:  Pelvic Rest and  routine post. Operative precautions    Motrin and Tylenol,   Norco only for severe refractory pain prn    Follow up Visits: Follow-up in 2 weeks      Other Discharge Instructions: Pelvic Rest, no heavy lifting    Kahlil Douglas MD  2025  9:39 AM       [1]   Patient Active Problem List  Diagnosis    Vitamin D deficiency    Other acne    Anxiety    Bloody discharge from right nipple    Ectopy of cervix    Atypical lobular hyperplasia (ALH) of breast - right    Ulcerative proctitis without complication (HCC)

## 2025-05-14 NOTE — ANESTHESIA POSTPROCEDURE EVALUATION
[facility]    Cheryl Garcia Patient Status:  Hospital Outpatient Surgery   Age/Gender 45 year old female MRN [id number]   Location [facility] SURGERY Attending Kahlil Douglas MD   Hosp Day # 0 PCP Noemi Nguyen MD       Anesthesia Post-op Note    HYSTEROSCOPY DILATION AND CURETTAGE , NOVASURE ENDOMETRIAL ABLATION    Procedure Summary       Date: 05/14/25 Room / Location:  MAIN OR 14 / [facility] MAIN OR    Anesthesia Start: 1031 Anesthesia Stop: 1120    Procedure: HYSTEROSCOPY DILATION AND CURETTAGE , NOVASURE ENDOMETRIAL ABLATION (Vagina ) Diagnosis: (MENORRHAGIA, FIBROID UTERUS)    Surgeons: Kahlil Douglas MD Anesthesiologist: Alex Bruno MD    Anesthesia Type: MAC ASA Status: 1            Anesthesia Type: MAC    Vitals Value Taken Time   BP 91/59 05/14/25 11:20   Temp 96.9 °F (36.1 °C) 05/14/25 11:20   Pulse 75 05/14/25 11:20   Resp 16 05/14/25 11:20   SpO2 100 % 05/14/25 11:20           Patient Location: Same Day Surgery    Anesthesia Type: MAC    Airway Patency: patent    Postop Pain Control: adequate    Mental Status: preanesthetic baseline    Nausea/Vomiting: none    Cardiopulmonary/Hydration status: stable euvolemic    Complications: no apparent anesthesia related complications    Postop vital signs: stable    Dental Exam: Unchanged from Preop    Patient to be discharged home when criteria met.

## 2025-06-04 ENCOUNTER — TELEPHONE (OUTPATIENT)
Facility: CLINIC | Age: 45
End: 2025-06-04

## 2025-06-09 ENCOUNTER — OFFICE VISIT (OUTPATIENT)
Facility: CLINIC | Age: 45
End: 2025-06-09
Payer: COMMERCIAL

## 2025-06-09 VITALS
DIASTOLIC BLOOD PRESSURE: 66 MMHG | SYSTOLIC BLOOD PRESSURE: 100 MMHG | HEIGHT: 64 IN | BODY MASS INDEX: 21.45 KG/M2 | WEIGHT: 125.63 LBS

## 2025-06-09 DIAGNOSIS — Z09 SURGERY FOLLOW-UP: Primary | ICD-10-CM

## 2025-06-09 NOTE — PROGRESS NOTES
POST OPERATIVE VISIT      S/p HS, D&C, Corey EA  For:  bleeding and endometrial polyps      Date: 5/14/25 Complications:  no none       No complaints    The patient's symptoms have  resolved since the procedure.    The intraoperative  findings were  reviewed with the patient with images if they were taken.    Pathology:  Endometrium, curettage:  - Polypoid fragments of benign secretory endometrium.    Exam:  There were no vitals filed for this visit.     deferred    A: s/p HS, D&C - Novasure Endometria Ablation    P:  Routine post. Operative instructions       Follow up:  3 months to assess symptoms diary.     Kahlil Douglas MD  6/9/2025

## (undated) DEVICE — STERILE POLYISOPRENE POWDER-FREE SURGICAL GLOVES: Brand: PROTEXIS

## (undated) DEVICE — SOLUTION IRRIG 1000ML 0.9% NACL USP BTL

## (undated) DEVICE — STERILE SYNTHETIC POLYISOPRENE POWDER-FREE SURGICAL GLOVES WITH HYDROGEL COATING, SMOOTH FINISH, STRAIGHT FINGER: Brand: PROTEXIS

## (undated) DEVICE — PACK GYNE CUSTOM

## (undated) DEVICE — SUTURE VICRYL 2-0

## (undated) DEVICE — SOLUTION IRRIG 3000ML 0.9% NACL FLX CONT

## (undated) DEVICE — GLOVE,SURG,SENSICARE SLT,LF,PF,7.5: Brand: MEDLINE

## (undated) DEVICE — LIGHT HANDLE

## (undated) DEVICE — HANDPIECE ABLAT DIA6MM ENDOMET IMPED CTRL DEV

## (undated) DEVICE — SET TUBING DELTER CYSTO IRRIG L77IN DIA0.241IN BLDR NVENT

## (undated) DEVICE — SKN PREP SPNG STKS PVP PNT STR: Brand: MEDLINE INDUSTRIES, INC.

## (undated) DEVICE — SLEEVE COMPR MD KNEE LEN SGL USE KENDALL SCD

## (undated) DEVICE — SUTURE VICRYL 3-0 SH

## (undated) DEVICE — CATHETER,URETHRAL,REDRUBBER,STRL,16FR: Brand: MEDLINE

## (undated) DEVICE — NEEDLE SPNL 22GA L3.5IN BLK QNCKE STYL DISP

## (undated) DEVICE — SUTURE MONOCRYL 4-0 PS-2

## (undated) DEVICE — Device

## (undated) DEVICE — SCD SLEEVE KNEE HI BLEND

## (undated) DEVICE — Device: Brand: PLUMEPEN

## (undated) DEVICE — SOL  .9 1000ML BTL

## (undated) DEVICE — BREAST-HERNIA-PORT CDS-LF: Brand: MEDLINE INDUSTRIES, INC.

## (undated) DEVICE — SYRINGE MED 10ML LL CTRL W/ FNGR GRP CLR BRL

## (undated) DEVICE — STERILE LATEX POWDER-FREE SURGICAL GLOVES WITH HYDROGEL COATING, SMOOTH FINISH, STRAIGHT FINGER: Brand: PROTEXIS

## (undated) NOTE — MR AVS SNAPSHOT
[address]  [facility],  [address] [facility] [phone]               Thank you for choosing us for your health care visit with Brisa Cotton PA-C.   We are glad to serve you and happy to provide you with [facility] important for you to watch for any new symptoms, problems, or worsening of your condition. Over the next few days, the abdominal pain may come and go, or be continuous. Other common symptoms can include nausea and vomiting.  Sometimes it can be difficult t · Avoid whole-grain foods, whole fruits and vegetables, meats, seeds and nuts, fried or fatty foods, dairy, alcohol and spicy foods until your symptoms go away.   Follow-up care  Follow up with your healthcare provider, or as advised, if your pain does not · Desserts. Plain gelatin, popsicles, and fruit juice bars. As you feel better, you may add 6 to 8 ounces of yogurt per day. If you have diarrhea, don't have foods or drinks that contain sugar, high-fructose corn syrup, or sugar alcohols.   During the next Generic drug:  mesalamine   Place 1,000 mg rectally 3 (three) times a week. Pt takes 3 times a week at night.           escitalopram 10 MG Tabs   Take 1 tablet (10 mg total) by mouth daily.    Commonly known as:  LEXAPRO           MULTIPLE VITAMINS/WOMENS T

## (undated) NOTE — MR AVS SNAPSHOT
[address] [facility] [address]  [facility],  [address]  [address] [place] [address] [phone]               Thank you for choosing us for your health care visit with Kimberly Lim MD.  We are glad to serve you and happy to provide you with this lea You can get these medications from any pharmacy     Bring a paper prescription for each of these medications    - alprazolam 0.25 MG Tabs            Blue RoosterharXdynia     Sign up for [facility]Now, your secure online medical record.   [facility]Now will allow you to access pam

## (undated) NOTE — LETTER
Cheryl Garcia, :1980    CONSENT FOR PROCEDURE/SEDATION    1. I authorize the performance upon Cheryl Garcia  the following: Endometrial Biopsy    2. I authorize TERRI William (and whomever is designated as the doctor’s assistant), to perform the above-mentioned procedures.    3. If any unforeseen conditions arise during this procedure calling for additional  procedures, operations, or medications (including anesthesia and blood transfusion), I further request and authorize the doctor to do whatever he/she deems advisable in my interest.    4. I consent to the taking and reproduction of any photographs in the course of this procedure for professional purposes.    5. I consent to the administration of such sedation as may be considered necessary or advisable by the physician responsible for this service, with the exception of ______________________________________________________    6. I have been informed by my doctor of the nature and purpose of this procedure sedation, possible alternative methods of treatment, risk involved and possible complications.    7. If I have a Do Not Resuscitate (DNR) order in place, the physician and I (or the individual authorized to consent on my behalf) will discuss and agree as to whether the Do Not Resuscitate (DNR) order will remain in effect or will be discontinued during the performance of the procedure and the applicable recovery period. If the Do Not Resuscitate (DNR) order is discontinued and is to be reinstated following the procedure/recovery period, the physician will determine when the applicable recovery period ends for purposes of reinstating the Do Not Resuscitate (DNR) order.    Signature of Patient:_______________________________________________    Signature of person authorized to consent for patient:  _______________________________________________________________    Relationship to patient:  ____________________________________________    Witness: _________________________________________ Date:___________     Physician Signature: _______________________________ Date:___________